# Patient Record
Sex: FEMALE | Employment: FULL TIME | URBAN - METROPOLITAN AREA
[De-identification: names, ages, dates, MRNs, and addresses within clinical notes are randomized per-mention and may not be internally consistent; named-entity substitution may affect disease eponyms.]

---

## 2018-04-17 ENCOUNTER — HOSPITAL ENCOUNTER (EMERGENCY)
Facility: HOSPITAL | Age: 53
Discharge: HOME | End: 2018-04-17
Attending: FAMILY MEDICINE
Payer: COMMERCIAL

## 2018-04-17 VITALS
WEIGHT: 171 LBS | SYSTOLIC BLOOD PRESSURE: 154 MMHG | HEIGHT: 63 IN | OXYGEN SATURATION: 99 % | TEMPERATURE: 98 F | HEART RATE: 68 BPM | BODY MASS INDEX: 30.3 KG/M2 | RESPIRATION RATE: 18 BRPM | DIASTOLIC BLOOD PRESSURE: 96 MMHG

## 2018-04-17 DIAGNOSIS — R03.0 ELEVATED BLOOD PRESSURE READING: ICD-10-CM

## 2018-04-17 DIAGNOSIS — G44.85 PRIMARY STABBING HEADACHE: Primary | ICD-10-CM

## 2018-04-17 PROCEDURE — 63600175 PHARM REV CODE 636 W HCPCS: Performed by: FAMILY MEDICINE

## 2018-04-17 PROCEDURE — 99283 EMERGENCY DEPT VISIT LOW MDM: CPT | Mod: 25

## 2018-04-17 PROCEDURE — 96372 THER/PROPH/DIAG INJ SC/IM: CPT

## 2018-04-17 PROCEDURE — 25000003 PHARM REV CODE 250: Performed by: FAMILY MEDICINE

## 2018-04-17 RX ORDER — PROMETHAZINE HYDROCHLORIDE 25 MG/ML
25 INJECTION, SOLUTION INTRAMUSCULAR; INTRAVENOUS
Status: COMPLETED | OUTPATIENT
Start: 2018-04-17 | End: 2018-04-17

## 2018-04-17 RX ORDER — KETOROLAC TROMETHAMINE 30 MG/ML
60 INJECTION, SOLUTION INTRAMUSCULAR; INTRAVENOUS
Status: COMPLETED | OUTPATIENT
Start: 2018-04-17 | End: 2018-04-17

## 2018-04-17 RX ORDER — CLONIDINE HYDROCHLORIDE 0.1 MG/1
0.1 TABLET ORAL
Status: COMPLETED | OUTPATIENT
Start: 2018-04-17 | End: 2018-04-17

## 2018-04-17 RX ADMIN — CLONIDINE HYDROCHLORIDE 0.1 MG: 0.1 TABLET ORAL at 10:04

## 2018-04-17 RX ADMIN — PROMETHAZINE HYDROCHLORIDE 25 MG: 25 INJECTION INTRAMUSCULAR; INTRAVENOUS at 08:04

## 2018-04-17 RX ADMIN — KETOROLAC TROMETHAMINE 60 MG: 30 INJECTION, SOLUTION INTRAMUSCULAR; INTRAVENOUS at 08:04

## 2018-04-18 NOTE — ED PROVIDER NOTES
Encounter Date: 2018       History     Chief Complaint   Patient presents with    Headache    Nausea    Dizziness     Complains of severe headache, started two hours ago. Nauseated, unable to allow me to perform complete exam, states she will vomit. History of headaches.           Review of patient's allergies indicates:  No Known Allergies  History reviewed. No pertinent past medical history.  Past Surgical History:   Procedure Laterality Date     SECTION       No family history on file.  Social History   Substance Use Topics    Smoking status: Current Some Day Smoker     Packs/day: 0.50    Smokeless tobacco: Never Used    Alcohol use Yes      Comment: social     Review of Systems   Constitutional: Negative.    HENT: Negative.    Eyes: Negative.  Negative for photophobia and visual disturbance.   Respiratory: Negative.    Cardiovascular: Negative.    Gastrointestinal: Negative.    Endocrine: Negative.    Genitourinary: Negative.    Musculoskeletal: Negative.    Allergic/Immunologic: Negative.    Neurological: Positive for light-headedness and headaches. Negative for dizziness.   Hematological: Negative.    Psychiatric/Behavioral: Negative.        Physical Exam     Initial Vitals [18]   BP Pulse Resp Temp SpO2   (!) 176/107 84 20 97.9 °F (36.6 °C) 99 %      MAP       130         Physical Exam    Nursing note and vitals reviewed.  Constitutional: She appears well-developed and well-nourished. She is not diaphoretic. No distress.   HENT:   Head: Normocephalic and atraumatic.   Eyes:   Eyes appear normal however pt is unable to let me perform pupillary exam.    Neck: Normal range of motion. Neck supple.   Cardiovascular: Normal rate, regular rhythm, normal heart sounds and intact distal pulses. Exam reveals no gallop and no friction rub.    No murmur heard.  Pulmonary/Chest: Breath sounds normal. No respiratory distress. She has no wheezes. She has no rales.   Abdominal: Soft. Bowel  sounds are normal. She exhibits no distension. There is no tenderness.   Musculoskeletal: Normal range of motion. She exhibits no edema.   Neurological: She is alert and oriented to person, place, and time. She has normal strength.   Skin: Skin is warm and dry. Capillary refill takes less than 2 seconds. No rash noted. No erythema.   Psychiatric: She has a normal mood and affect. Her behavior is normal. Judgment and thought content normal.         ED Course   Procedures  Labs Reviewed - No data to display                               Clinical Impression:   The primary encounter diagnosis was Primary stabbing headache. A diagnosis of Elevated blood pressure reading was also pertinent to this visit.                           Allyn Estrada MD  04/17/18 9112

## 2018-05-26 ENCOUNTER — HOSPITAL ENCOUNTER (EMERGENCY)
Facility: HOSPITAL | Age: 53
Discharge: HOME | End: 2018-05-26
Attending: INTERNAL MEDICINE
Payer: COMMERCIAL

## 2018-05-26 VITALS
TEMPERATURE: 98 F | OXYGEN SATURATION: 96 % | WEIGHT: 164 LBS | RESPIRATION RATE: 18 BRPM | BODY MASS INDEX: 29.06 KG/M2 | SYSTOLIC BLOOD PRESSURE: 154 MMHG | DIASTOLIC BLOOD PRESSURE: 86 MMHG | HEART RATE: 74 BPM | HEIGHT: 63 IN

## 2018-05-26 DIAGNOSIS — R03.0 ELEVATED BLOOD PRESSURE READING: Primary | ICD-10-CM

## 2018-05-26 PROCEDURE — 99283 EMERGENCY DEPT VISIT LOW MDM: CPT

## 2018-05-26 RX ORDER — KETOROLAC TROMETHAMINE 30 MG/ML
60 INJECTION, SOLUTION INTRAMUSCULAR; INTRAVENOUS
Status: DISCONTINUED | OUTPATIENT
Start: 2018-05-26 | End: 2018-05-26

## 2018-05-26 RX ORDER — MECLIZINE HCL 12.5 MG 12.5 MG/1
12.5 TABLET ORAL 3 TIMES DAILY PRN
Qty: 20 TABLET | Refills: 0 | Status: SHIPPED | OUTPATIENT
Start: 2018-05-26 | End: 2019-08-30

## 2018-05-26 NOTE — ED PROVIDER NOTES
CHIEF COMPLAINT  Chief Complaint   Patient presents with    Dizziness    Hypertension    feeling faint       HPI  Chio Castano a 52 y.o. female who presents to the ED complaining of dizziness.  was working as  when she started feeling really dizzy.  has been told that she had high blood pressure in the past but when she went to see her PCP it was good. Has never taken anything for her Blood Pressure.    CURRENT MEDICATIONS  No current facility-administered medications on file prior to encounter.      No current outpatient prescriptions on file prior to encounter.       ALLERGIES  Review of patient's allergies indicates:  No Known Allergies      There is no immunization history on file for this patient.    PAST MEDICAL HISTORY  Past Medical History:   Diagnosis Date    Migraine headache        SURGICAL HISTORY  Past Surgical History:   Procedure Laterality Date     SECTION         SOCIAL HISTORY  Social History     Social History    Marital status: Single     Spouse name: N/A    Number of children: N/A    Years of education: N/A     Occupational History    Not on file.     Social History Main Topics    Smoking status: Current Some Day Smoker     Packs/day: 0.50    Smokeless tobacco: Never Used    Alcohol use Yes      Comment: social    Drug use: No    Sexual activity: Yes     Other Topics Concern    Not on file     Social History Narrative    No narrative on file       FAMILY HISTORY  History reviewed. No pertinent family history.    REVIEW OF SYSTEMS  Constitutional: No fever, chills, or weakness.  Eyes: No redness, pain, or discharge  HENT: No ear pain, no headache, no rhinorrhea, no throat pain  Respiratory: No cough, wheezing or shortness of breath  Cardiovascular: No chest pain, palpitations or edema  GI: No abdominal pain, nausea, vomiting or diarrhea  Gu: No dysuria, no hematuria, or discharge  Musculoskeletal: No pain, full range of motion. Good sensation  Skin:  "No rash or abrasion  Neurologic: No focal weakness or sensory changes.  All systems otherwise negative except as noted in the Review of Systems and History of Present Illness      PHYSICAL EXAM  Reviewed Triage Note  VITAL SIGNS:   Patient Vitals for the past 24 hrs:   BP Temp Temp src Pulse Resp SpO2 Height Weight   05/26/18 1830 (!) 154/86 - - 74 18 - - -   05/26/18 1656 (!) 161/102 98.3 °F (36.8 °C) Oral 84 20 96 % 5' 3" (1.6 m) 74.4 kg (164 lb)     Constitutional: Well developed, well nourished, Alert and oriented x3, No acute distress, non-toxic appearance.  HENT: Normocephalic, Atraumatic, Bilateral external ears normal, external nose negative, oropharynx moist, No oral exudates.  Eyes: PERRL, EOMI, Conjunctiva normal, No discharge.  Neck: Normal range of motion, no tenderness, supple, no carotid bruits  Respiratory: Normal breath sounds, no respiratory distress, no wheezing, no rhonchi, no rales  Cardiovascular: Normal heart rate, normal rhythm, no murmurs, no rubs, no gallops.  Gi: Bowel sounds normal, soft, no tenderness, non-distended, no masses, no pulsatile masses.  Musculoskeletal: No edema, no tenderness, no cyanosis, no clubbing. Good range of motion in all major joints. No tenderness to palpation or major deformities noted.   Integument: Warm, Dry, No erythema, no rash  Neurologic: Normal motor function, normal sensory function. No focal deficits noted. Intact distal pulses  Psychiatric: Affect normal, judgment normal, mood normal      LABS  Pertinent labs reviewed. (see chart for details)  Labs Reviewed - No data to display    RADIOLOGY  No orders to display         PROCEDURE  Procedures      ED COURSE & MEDICAL DECISION MAKING  ED Course as of May 26 2116   Sat May 26, 2018   1825 BP now 152/86. States still feels a little dizzy  [NP]      ED Course User Index  [NP] MELANIE Vásquez       Physical exam findings discussed with patient. No acute emergent medical condition identified at " this time to warrant further testing. Will dispo home with instructions to follow up with PCP as needed, return to the ED for worsening condition. Pt agrees with plan of care.     DISPOSITION  Patient discharged in stable condition 5/26/2018  6:46 PM      CLINICAL IMPRESSION:  The encounter diagnosis was Elevated blood pressure reading.    Patient advised to follow-up with your PCP within 3 days for BP re-check if Blood Pressure was >120/80 without history of hypertension.         Monalisa Vick, MELANIE  05/26/18 6508

## 2018-08-01 DIAGNOSIS — M89.8X1 PAIN OF RIGHT CLAVICLE: Primary | ICD-10-CM

## 2018-08-03 ENCOUNTER — OFFICE VISIT (OUTPATIENT)
Dept: ORTHOPEDICS | Facility: CLINIC | Age: 53
End: 2018-08-03
Payer: OTHER MISCELLANEOUS

## 2018-08-03 ENCOUNTER — HOSPITAL ENCOUNTER (OUTPATIENT)
Dept: RADIOLOGY | Facility: HOSPITAL | Age: 53
Discharge: HOME | End: 2018-08-03
Attending: ORTHOPAEDIC SURGERY
Payer: OTHER MISCELLANEOUS

## 2018-08-03 VITALS
SYSTOLIC BLOOD PRESSURE: 146 MMHG | DIASTOLIC BLOOD PRESSURE: 84 MMHG | HEIGHT: 63 IN | WEIGHT: 164 LBS | HEART RATE: 75 BPM | BODY MASS INDEX: 29.06 KG/M2

## 2018-08-03 DIAGNOSIS — M89.8X1 PAIN OF RIGHT CLAVICLE: ICD-10-CM

## 2018-08-03 DIAGNOSIS — M13.811 OTHER SPECIFIED ARTHRITIS, RIGHT SHOULDER: ICD-10-CM

## 2018-08-03 DIAGNOSIS — S46.011A ROTATOR CUFF STRAIN, RIGHT, INITIAL ENCOUNTER: ICD-10-CM

## 2018-08-03 DIAGNOSIS — M25.511 RIGHT SHOULDER PAIN, UNSPECIFIED CHRONICITY: ICD-10-CM

## 2018-08-03 DIAGNOSIS — S43.101A: ICD-10-CM

## 2018-08-03 DIAGNOSIS — M25.511 RIGHT SHOULDER PAIN, UNSPECIFIED CHRONICITY: Primary | ICD-10-CM

## 2018-08-03 PROCEDURE — 99204 OFFICE O/P NEW MOD 45 MIN: CPT | Mod: S$GLB,,, | Performed by: ORTHOPAEDIC SURGERY

## 2018-08-03 PROCEDURE — 73030 X-RAY EXAM OF SHOULDER: CPT | Mod: 26,RT,, | Performed by: RADIOLOGY

## 2018-08-03 PROCEDURE — 73030 X-RAY EXAM OF SHOULDER: CPT | Mod: TC,FY,RT

## 2018-08-03 PROCEDURE — 99999 PR PBB SHADOW E&M-EST. PATIENT-LVL III: CPT | Mod: 25,PBBFAC,, | Performed by: ORTHOPAEDIC SURGERY

## 2018-08-03 PROCEDURE — 73000 X-RAY EXAM OF COLLAR BONE: CPT | Mod: TC,FY,RT

## 2018-08-03 PROCEDURE — 73000 X-RAY EXAM OF COLLAR BONE: CPT | Mod: 26,RT,, | Performed by: RADIOLOGY

## 2018-08-03 NOTE — PROGRESS NOTES
Subjective:      Patient ID: Chio Garvey is a 52 y.o. female.    Chief Complaint: Shoulder Pain (right clavicle pain)    Referring Provider: No referring provider defined for this encounter.     HPI:  Ms. Garvey is a 53-year-old right-hand-dominant female who presented today with approximately 2 weeks of right shoulder and sternal clavicular pain which began when she was hoisting linen bags into a linen been and felt a pop in her shoulder.  She stated she had heavy lifting bags and she was lifting arm up overhead putting them into a bin shoot.  Her date of injury was approximately 06/15/2018.  Internal and external rotation of her shoulder increases her symptoms. Heating pad improves them. Her symptoms occasionally awaken her at night.  She has taken NSAIDs with help but has not done physical therapy nor had injections.    Past Medical History:   Diagnosis Date     *  * Migraine headache  Seasonal allergies  Anxiety      Past Surgical History:   Procedure Laterality Date     *  SECTION  T&A       Review of patient's allergies indicates:  No Known Allergies    Social History     Occupational History    Housekeeping.     Social History Main Topics    Smoking status: Current Some Day Smoker     Packs/day: 0.50    Smokeless tobacco: Never Used    Alcohol use Yes      Comment: social    Drug use: No    Sexual activity: Yes      Family history:  Father:  , heart attack.  Mother:  Alive, hypertension.  Brother:  1, alive, denied medical problems  Sister:  6, 1 , complications of medications.  Son:  3, 1 , still birth.  Daughter:  2, alive, scoliosis.    Previous Hospitalizations:  Childbirth.    Review of Systems   Constitution: Negative for chills and fever.   HENT: Negative for hearing loss.    Eyes: Negative for double vision.   Cardiovascular: Negative for syncope.   Respiratory: Negative for cough.    Endocrine: Negative for polydipsia.   Skin: Negative for poor wound healing and  rash.   Musculoskeletal: Positive for joint pain.   Gastrointestinal: Negative for constipation and diarrhea.   Genitourinary: Negative for flank pain.   Neurological: Negative for numbness.   Psychiatric/Behavioral: The patient is nervous/anxious.    Allergic/Immunologic: Positive for environmental allergies.          Objective:      Physical Exam:  General:  Nervous, unique, AAOx3.  No acute distress  HEENT: Normocephalic, PEARLA EOMI, Good Dentition  Neck: Supple, No JVD  Chest: Symetric, equal excursion on inspiration  Abdomen: Soft NTND  Vascular:  Pulses intact and equal bilaterally.  Capillary refill less than 3 seconds and equal bilaterally  Neurologic:  Pinprick and soft touch over both deltoids intact and equal bilaterally.  Integment:  No ecchymosis, no errythema  Extremity:  Shoulder:  Forward flexion/abduction equal bilaterally 0/180 degrees. Internal rotation equal bilaterally T10.  Mildly positive lift-off right upper extremity.  External rotation equal bilaterally 0/22 degrees. Full can negative both shoulders.  Empty can mildly positive right shoulder.  Henson/Neer mildly positive right shoulder.  Cross-arm positive right shoulder with tenderness at the sternoclavicular joint. Nontender over the acromioclavicular joint both shoulders.  Mild prominence right stay acromioclavicular joint. Prominence right sternoclavicular joint. Tender with palpation right sternoclavicular joint. Nontender in the bicipital groove both shoulders.  Yergason's negative both shoulders.  Apprehension/relocation negative both shoulders.  Radiography:  Personally reviewed x-rays of the right shoulder and right clavicle which showed no fracture, acromioclavicular arthritis and type 2 acromion present. C-spine arthritis present.      Assessment:       Impression:     1. Closed anterior sternoclavicular dislocation of clavicle,right, initial encounter    2. Rotator cuff strain, right shoulder, initial encounter    3. AC  arthritis, right shoulder          Plan:       1.  Discussed physical examination and radiographic findings with the patient. Chio understands that she has 2 items affecting her, she has a strain of her rotator cuff and an anterior dislocation of the sternoclavicular joint. She understands with conservative management she could improved.  If she fails conservative management then surgical intervention can be entertained.     2.  Offered a steroid injection to the right shoulder she adamantly refused.  3.  Refer to physical therapy to start motion exercises with pain reduction, and upper extremity strengthening.  4.  Home exercises to include Codman exercises, wall walking exercises, cane exercises, towel exercises, and shoulder extension exercises were shown discussed with the patient.  5.  Norco 5/325, 1 or 2 p.o. Q 4-6 hours, dispense 20, refill 0.  6.  Voltaren 1% gel, apply to affected area twice daily, dispense 100 g, refill 5.  7.  Mobic 15 mg, 1 p.o. q.day, dispense number 30, refill 5.  8.  Because the patient was having some pain she was placed in a sling she understands she should stop wearing the sling as soon as possible and start the exercises as discussed above.  9.  Return to work with restrictions of no lifting/pushing/pulling greater than 5 lb with the right upper extremity.  10.  Expect the patient to be back to full unrestricted activities within the next 6-10 weeks.  11.  Follow up approximately 1 month after beginning physical therapy for re-evaluation.  If she has not improved at next visit we will rediscuss possible injection with her at that time.

## 2018-08-06 ENCOUNTER — CLINICAL SUPPORT (OUTPATIENT)
Dept: REHABILITATION | Facility: HOSPITAL | Age: 53
End: 2018-08-06
Payer: OTHER MISCELLANEOUS

## 2018-08-06 DIAGNOSIS — S46.019A ROTATOR CUFF STRAIN: ICD-10-CM

## 2018-08-06 DIAGNOSIS — S43.101A: ICD-10-CM

## 2018-08-06 DIAGNOSIS — S46.011A ROTATOR CUFF STRAIN, RIGHT, INITIAL ENCOUNTER: Primary | ICD-10-CM

## 2018-08-06 DIAGNOSIS — S42.009A: Primary | ICD-10-CM

## 2018-08-06 PROCEDURE — 97110 THERAPEUTIC EXERCISES: CPT

## 2018-08-06 PROCEDURE — 97166 OT EVAL MOD COMPLEX 45 MIN: CPT

## 2018-08-06 PROCEDURE — 97010 HOT OR COLD PACKS THERAPY: CPT

## 2018-08-06 NOTE — PROGRESS NOTES
"[facility]  Occupational Therapy Initial Evaluation    Name: Vasile Garvey  Clinic Number: [id number]    Therapy Diagnosis:   Encounter Diagnoses   Name Primary?    Rotator cuff strain, right, initial encounter Yes    Dislocation of clavicle, closed, right, initial encounter      Physician: No ref. 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 She reports trying to doctor it with a heating pad in the following days. Patient reports the pain and discomfort have become too much to tolerate at this point.       Past Medical History:   Diagnosis Date    Migraine headache      Vasile Garvey  has a past surgical history that includes  section.    Vasile has a current medication list which includes the following prescription(s): meclizine.    Review of patient's allergies indicates:  No Known Allergies     Imaging, X-Rays    Prior Therapy: none  Social History: lives with their spouse   Occupation: Housekeeping at [employer]  Prior Level of Function: Independent   Current Level of Function: Patient reports she is performing her ADL's in tasks of dressing, bathing, grooming, and toileting, but adds that is "just hurts." This limitation of R dominant UE " "affects her skills required in performing work tasks (IADL's) in housekeeping.     Pain:  Current 5/10, worst 10/10  Location: right shoulder   Description: Throbbing, shoulder and neck RUE  Aggravating Factors: Flexing and Lifting  Easing Factors: Patient reports not tolerating [place] because she reports it is too strong.     Pts goals:   I want to get the swelling down and decrease the pain R clavicle and to be able to use it without pain and be back to normal    Objective     Patient reports limitations in requires work skills required of housekeeping tasks in the [employer] of employment, [employer]. Patient reports increase in pain, discomfort, and limitations R shoulder with observable deformity at sternum and clavicular joint. Patient appears frustrated AEB shaking her head and making comments that she is very uncomfortable or that the pain radiates to R neck muscles. She repeatedly reports her shoulder causes "stretching" and "discomfort." OT discussed with patient that she would feel discomfort with this injury and that the goal is to improve her mobility and decrease her pain level in order for her to return to normal activities. OT discussed with patient positioning needs/use of a pillow for sleeping, in addition to management of pain through prescribed medications given. She reports the Norco seems like it is too strong. OT suggested cutting the pill in half.              TREATMENT   Treatment Time In: 330  Treatment Time Out: 400  Total Treatment time separate from Evaluation:30 minutes    VASILE received therapeutic exercises to develop ROM including:  Pendulums performed RUE forward, lateral, clockwise, and counter clockwise as tolerated x 25 reps each  1# dowel in B elbow flexion x 15  B shoulder shrugs x 10     VASILE received cold pack for 15 minutes to R clavicle and shoulder/neck area    Home Exercises and Patient Education Provided    Education provided:   -R shoulder sling wear with " adjustments pulling it up higher and closer to her body; use of pillow to sleep/position UE at night.    Written Home Exercises Provided: yes.  Exercises were reviewed and VASILE was able to demonstrate them prior to the end of the session.  VASILE demonstrated good  understanding of the education provided.     See EMR under Patient Instructions for exercises provided 8/6/2018.    Assessment   Vasile is a 52 y.o. female referred to outpatient Occupational Therapy with a medical diagnosis of R clavicle dislocation and RTC strain. . Pt presents with increase in pain R clavicle/shoulder/neck and impaired mobility.    Pt prognosis is Good.   Pt will benefit from skilled outpatient Physical Therapy to address the deficits stated above and in the chart below, provide pt/family education, and to maximize pt's level of independence.     Plan of care discussed with patient: Yes  Pt's spiritual, cultural and educational needs considered and patient is agreeable to the plan of care and goals as stated below:     Anticipated Barriers for therapy: none      Goals:  Short Term Goals: 3 weeks   Patient will demonstrate independence in performing pendulums as HEP  Patient will verbalize understanding in positioning and sling wear consistently RUE  Patient will report <4/10 pain with ROM R shoulder    Long Term Goals:6 weeks   Patient will tolerate AAROM R shoulder FF/ABD/EXT exercises >120 degrees  Patient will report <2/10 pain R shoulder FF/ABD up to 100 degrees AROM   Patient will tolerate MIN resist R shoulder activities.     Plan   Plan of care Certification: 8/6/2018 to 09/14/2018.    Outpatient Occupational Therapy 2-3 times weekly for 6 weeks to include the following interventions: Manual Therapy, Moist Heat/ Ice, Patient Education, Self Care, Therapeutic Activites and Therapeutic Exercise.     Opal Escobedo, OT   Signature of Therapist    I certify the need for these services furnished under this plan of treatment and while  under my care.______________________________                           Physician/Referring Practitioner  Date of Signature:

## 2018-08-06 NOTE — PATIENT INSTRUCTIONS
Pendulum (Flexibility)    1. Lean over next to a table, with your left arm supporting your weight on the table.  2. Relax your right arm and let it hang straight down.  3. Slowly begin to swing your right arm in a small [place]. Gradually make the [place] bigger if you can. Change direction after 1 minute of motion.  4. Next, swing your right arm backward and forward. Then move it side to side. Change direction after 1 minute of motion.  5. Repeat these movements for about 5 minutes.  6. Do this exercise 3 times a day, or as often as instructed.  Date Last Reviewed: 3/10/2016  © 1869-2708 [employer]. [address] [place] [address]. All rights reserved. This information is not intended as a substitute for professional medical care. Always follow your healthcare professional's instructions.        Shoulder Shrug Exercise    To start, sit in a chair with your feet flat on the floor. Shift your weight slightly forward to avoid rounding your back. Relax. Keep your ears, shoulders, and hips aligned:  · Raise both of your shoulders as high as you can, as if you were trying to touch them to your ears. Keep your head and neck still and relaxed.  · Hold for a count of 10. Release.  · Repeat 5 times.  For your safety, check with your healthcare provider before starting an exercise program.   Date Last Reviewed: 8/16/2015  © 7438-2135 [employer]. [address] [place] [address]. All rights reserved. This information is not intended as a substitute for professional medical care. Always follow your healthcare professional's instructions.

## 2018-08-06 NOTE — PLAN OF CARE
"[facility]  Occupational Therapy Initial Evaluation    Name: Vasile Garvey  Clinic Number: [id number]    Therapy Diagnosis:   Encounter Diagnoses   Name Primary?    Rotator cuff strain, right, initial encounter Yes    Dislocation of clavicle, closed, right, initial encounter      Physician: No ref. 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 She reports trying to doctor it with a heating pad in the following days. Patient reports the pain and discomfort have become too much to tolerate at this point.       Past Medical History:   Diagnosis Date    Migraine headache      Vasile Garvey  has a past surgical history that includes  section.    Vasile has a current medication list which includes the following prescription(s): meclizine.    Review of patient's allergies indicates:  No Known Allergies     Imaging, X-Rays    Prior Therapy: none  Social History: lives with their spouse   Occupation: Housekeeping at [employer]  Prior Level of Function: Independent   Current Level of Function: Patient reports she is performing her ADL's in tasks of dressing, bathing, grooming, and toileting, but adds that is "just hurts." This limitation of R dominant UE " "affects her skills required in performing work tasks (IADL's) in housekeeping.     Pain:  Current 5/10, worst 10/10  Location: right shoulder   Description: Throbbing, shoulder and neck RUE  Aggravating Factors: Flexing and Lifting  Easing Factors: Patient reports not tolerating [place] because she reports it is too strong.     Pts goals:   I want to get the swelling down and decrease the pain R clavicle and to be able to use it without pain and be back to normal    Objective     Patient reports limitations in requires work skills required of housekeeping tasks in the [employer] of employment, [employer]. Patient reports increase in pain, discomfort, and limitations R shoulder with observable deformity at sternum and clavicular joint. Patient appears frustrated AEB shaking her head and making comments that she is very uncomfortable or that the pain radiates to R neck muscles. She repeatedly reports her shoulder causes "stretching" and "discomfort." OT discussed with patient that she would feel discomfort with this injury and that the goal is to improve her mobility and decrease her pain level in order for her to return to normal activities. OT discussed with patient positioning needs/use of a pillow for sleeping, in addition to management of pain through prescribed medications given. She reports the Norco seems like it is too strong. OT suggested cutting the pill in half.              TREATMENT   Treatment Time In: 330  Treatment Time Out: 400  Total Treatment time separate from Evaluation:30 minutes    VASILE received therapeutic exercises to develop ROM including:  Pendulums performed RUE forward, lateral, clockwise, and counter clockwise as tolerated x 25 reps each  1# dowel in B elbow flexion x 15  B shoulder shrugs x 10     VASILE received cold pack for 15 minutes to R clavicle and shoulder/neck area    Home Exercises and Patient Education Provided    Education provided:   -R shoulder sling wear with " adjustments pulling it up higher and closer to her body; use of pillow to sleep/position UE at night.    Written Home Exercises Provided: yes.  Exercises were reviewed and VASILE was able to demonstrate them prior to the end of the session.  VASILE demonstrated good  understanding of the education provided.     See EMR under Patient Instructions for exercises provided 8/6/2018.    Assessment   Vasile is a 52 y.o. female referred to outpatient Occupational Therapy with a medical diagnosis of R clavicle dislocation and RTC strain. . Pt presents with increase in pain R clavicle/shoulder/neck and impaired mobility.    Pt prognosis is Good.   Pt will benefit from skilled outpatient Physical Therapy to address the deficits stated above and in the chart below, provide pt/family education, and to maximize pt's level of independence.     Plan of care discussed with patient: Yes  Pt's spiritual, cultural and educational needs considered and patient is agreeable to the plan of care and goals as stated below:     Anticipated Barriers for therapy: none      Goals:  Short Term Goals: 3 weeks   Patient will demonstrate independence in performing pendulums as HEP  Patient will verbalize understanding in positioning and sling wear consistently RUE  Patient will report <4/10 pain with ROM R shoulder    Long Term Goals:6 weeks   Patient will tolerate AAROM R shoulder FF/ABD/EXT exercises >120 degrees  Patient will report <2/10 pain R shoulder FF/ABD up to 100 degrees AROM   Patient will tolerate MIN resist R shoulder activities.     Plan   Plan of care Certification: 8/6/2018 to 09/14/2018.    Outpatient Occupational Therapy 2-3 times weekly for 6 weeks to include the following interventions: Manual Therapy, Moist Heat/ Ice, Patient Education, Self Care, Therapeutic Activites and Therapeutic Exercise.     Opal Escobedo, OT   Signature of Therapist    I certify the need for these services furnished under this plan of treatment and while  under my care.______________________________                           Physician/Referring Practitioner  Date of Signature:

## 2018-08-07 ENCOUNTER — TELEPHONE (OUTPATIENT)
Dept: ORTHOPEDICS | Facility: CLINIC | Age: 53
End: 2018-08-07

## 2018-08-07 NOTE — TELEPHONE ENCOUNTER
Called patient to inform her that Dr. cardona will no longer be at [facility] on Fri and Wed. No answer at patient's phone and left voicemail stating her appt has been rescheduled for Monday, Sept 17 at 9:00AM at the [place] location.

## 2018-08-08 ENCOUNTER — CLINICAL SUPPORT (OUTPATIENT)
Dept: REHABILITATION | Facility: HOSPITAL | Age: 53
End: 2018-08-08
Payer: OTHER MISCELLANEOUS

## 2018-08-08 DIAGNOSIS — M13.811 OTHER SPECIFIED ARTHRITIS, RIGHT SHOULDER: Primary | ICD-10-CM

## 2018-08-08 PROCEDURE — 97010 HOT OR COLD PACKS THERAPY: CPT

## 2018-08-08 PROCEDURE — 97110 THERAPEUTIC EXERCISES: CPT

## 2018-08-08 NOTE — PROGRESS NOTES
Occupational Therapy Daily Treatment Note     Name: Vasile QUACH [facility] Number: [id number]    Therapy Diagnosis:   Encounter Diagnosis   Name Primary?    Other specified arthritis, right shoulder Yes     Physician: William Dyer DO    Visit Date: 8/8/2018  Physician Orders: OT Eval & Treat  Medical Diagnosis: R sternoclavicular dislocation   Evaluation Date: 08/06/2018  Authorization Period Expiration: 6 weeks  Plan of Care Certification Period: 09/14/2018  Visit # 2    Time In: 205  Time Out: 310  Total Billable Time: 45 minutes    Precautions: Standard    Subjective     Pt reports: she left her sling in the car and was in a hurry. OT encouraged patient to consistently wear the issued sling RUE.  She was compliant with use of pillow under RUE sleeping and that it was more comfortable.  Response to previous treatment:tolerated treatment successfully  Functional change: no change, continues to report discomfort    Pain: 5/10  Location: right shoulder      Objective   VASILE received hot pack for 15 minutes to R clavicle/shoulder/neck area     VASILE received therapeutic exercises to develop endurance, ROM and flexibility for including:  Pendulums RUE with guided instruction in forward, lateral, clockwise, and counter clockwise 2 x 30 each   B shoulder shrugs and rolls x 10 each  AROM B FF <90 degrees and ABD <90 degrees     VASILE received cold pack for 15 minutes to R clavicle/shoulder      Home Exercises Provided and Patient Education Provided     Education provided:   - Sling wear    Written Home Exercises Provided: Patient instructed to cont prior HEP.  Exercises were reviewed and VASILE was able to demonstrate them prior to the end of the session.  VASILE demonstrated good  understanding of the education provided.     See EMR under Patient Instructions for exercises provided prior visit.    Assessment     Patient tolerating treatments with report of discomfort. She reports heat as helping the most.     Pt prognosis is  Good.     Pt will continue to benefit from skilled outpatient occupational therapy to address the deficits listed in the problem list box on initial evaluation, provide pt/family education and to maximize pt's level of independence in the home and community environment.     Pt's spiritual, cultural and educational needs considered and pt agreeable to plan of care and goals.    Anticipated barriers to occupational therapy:     Goals:  Short Term Goals: 3 weeks   Patient will demonstrate independence in performing pendulums as HEP  Patient will verbalize understanding in positioning and sling wear consistently RUE  Patient will report <4/10 pain with ROM R shoulder     Long Term Goals:6 weeks   Patient will tolerate AAROM R shoulder FF/ABD/EXT exercises >120 degrees  Patient will report <2/10 pain R shoulder FF/ABD up to 100 degrees AROM   Patient will tolerate MIN resist R shoulder activities.     Plan     Continue OT per established POC.    Opal Escobedo, OT

## 2018-08-09 ENCOUNTER — CLINICAL SUPPORT (OUTPATIENT)
Dept: REHABILITATION | Facility: HOSPITAL | Age: 53
End: 2018-08-09
Payer: OTHER MISCELLANEOUS

## 2018-08-09 DIAGNOSIS — S43.101A: Primary | ICD-10-CM

## 2018-08-09 DIAGNOSIS — S46.011A ROTATOR CUFF STRAIN, RIGHT, INITIAL ENCOUNTER: ICD-10-CM

## 2018-08-09 PROCEDURE — 97110 THERAPEUTIC EXERCISES: CPT

## 2018-08-09 PROCEDURE — 97010 HOT OR COLD PACKS THERAPY: CPT

## 2018-08-09 NOTE — PROGRESS NOTES
Occupational Therapy Daily Treatment Note     Name: Vasile QUACH [facility] Number: [id number]    Therapy Diagnosis:   Encounter Diagnoses   Name Primary?    Dislocation of clavicle, closed, right, initial encounter Yes    Rotator cuff strain, right, initial encounter      Physician: William Dyer DO    Visit Date: 8/9/2018  Physician Orders: OT Eval & Treat  Medical Diagnosis: R sternoclavicular dislocation   Evaluation Date: 08/06/2018  Authorization Period Expiration: 6 weeks  Plan of Care Certification Period: 09/14/2018  Visit # 2    Time In: 300  Time Out: 400  Total Billable Time: 45 minutes    Precautions: Standard    Subjective     Pt reports: feeling like she did too much at work last night even on light duty. She reports feeling frustrated that she cannot perform task as well but also feels like she needs to perform household duties while on shift, even if on light duty. 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 Patient is upset and tearful related to her current situation. Encouraged patient to seek Amelia to discuss issues she is having.   Response to previous treatment:tolerated treatment successfully  Functional change: no change, continues to report discomfort    Pain: 5/10  Location: right shoulder      Objective   VASILE received hot pack for 15 minutes to R clavicle/shoulder/neck area     VASILE received therapeutic exercises to develop endurance, ROM and flexibility for including:  AROM R elbow flex/ext x 15  Extra light digi flex R hand x 25  Pendulums RUE with guided instruction in forward, lateral, clockwise, and counter clockwise 2 x 30 each     VASILE received cold pack for 15 minutes to R clavicle/shoulder      Home Exercises Provided and Patient Education Provided     Education provided:   - Sling wear  -need to seek [employer]elia to discuss issues she is having in continuing work on light duty.     Written Home Exercises Provided: Patient instructed to cont prior HEP.  Exercises were reviewed  and VASILE was able to demonstrate them prior to the end of the session.  VASILE demonstrated good  understanding of the education provided.     See EMR under Patient Instructions for exercises provided prior visit.    Assessment     Patient is having difficulty tolerating treatments. Throughout treatment, patient is observed to become tearful and frustrated. She reports it is very uncomfortable.     Pt prognosis is Good.     Pt will continue to benefit from skilled outpatient occupational therapy to address the deficits listed in the problem list box on initial evaluation, provide pt/family education and to maximize pt's level of independence in the home and community environment. Patient may benefit from seeing MD sooner to discuss current issues due to patient difficulties in tolerating treatment and pain medications. She reports the medications are too much for her to handle/tolerate due to leaving her feeling groggy.     Pt's spiritual, cultural and educational needs considered and pt agreeable to plan of care and goals.    Anticipated barriers to occupational therapy: pain tolerance    Goals:  Short Term Goals: 3 weeks   Patient will demonstrate independence in performing pendulums as HEP  Patient will verbalize understanding in positioning and sling wear consistently RUE  Patient will report <4/10 pain with ROM R shoulder     Long Term Goals:6 weeks   Patient will tolerate AAROM R shoulder FF/ABD/EXT exercises >120 degrees  Patient will report <2/10 pain R shoulder FF/ABD up to 100 degrees AROM   Patient will tolerate MIN resist R shoulder activities.     Plan     Continue OT per established POC.    Opal Escobedo OT

## 2018-08-10 ENCOUNTER — OFFICE VISIT (OUTPATIENT)
Dept: ORTHOPEDICS | Facility: CLINIC | Age: 53
End: 2018-08-10
Payer: OTHER MISCELLANEOUS

## 2018-08-10 VITALS
BODY MASS INDEX: 29.06 KG/M2 | HEIGHT: 63 IN | HEART RATE: 71 BPM | SYSTOLIC BLOOD PRESSURE: 135 MMHG | WEIGHT: 164 LBS | DIASTOLIC BLOOD PRESSURE: 73 MMHG

## 2018-08-10 DIAGNOSIS — S43.101D: Primary | ICD-10-CM

## 2018-08-10 PROCEDURE — 99213 OFFICE O/P EST LOW 20 MIN: CPT | Mod: S$GLB,,, | Performed by: ORTHOPAEDIC SURGERY

## 2018-08-10 PROCEDURE — 99999 PR PBB SHADOW E&M-EST. PATIENT-LVL III: CPT | Mod: PBBFAC,,, | Performed by: ORTHOPAEDIC SURGERY

## 2018-08-10 RX ORDER — HYDROCODONE BITARTRATE AND ACETAMINOPHEN 5; 325 MG/1; MG/1
1 TABLET ORAL EVERY 6 HOURS PRN
COMMUNITY
End: 2018-08-27 | Stop reason: ALTCHOICE

## 2018-08-10 RX ORDER — MELOXICAM 15 MG/1
15 TABLET ORAL DAILY
COMMUNITY
End: 2019-08-30

## 2018-08-13 ENCOUNTER — CLINICAL SUPPORT (OUTPATIENT)
Dept: REHABILITATION | Facility: HOSPITAL | Age: 53
End: 2018-08-13
Payer: OTHER MISCELLANEOUS

## 2018-08-13 DIAGNOSIS — M13.811 OTHER SPECIFIED ARTHRITIS, RIGHT SHOULDER: Primary | ICD-10-CM

## 2018-08-13 PROCEDURE — 97010 HOT OR COLD PACKS THERAPY: CPT

## 2018-08-13 PROCEDURE — 97110 THERAPEUTIC EXERCISES: CPT

## 2018-08-13 PROCEDURE — 97140 MANUAL THERAPY 1/> REGIONS: CPT

## 2018-08-13 NOTE — PROGRESS NOTES
Occupational Therapy Daily Treatment Note     Name: Vasile QUACH [facility] Number: [id number]    Therapy Diagnosis:   Encounter Diagnosis   Name Primary?    Other specified arthritis, right shoulder Yes     Physician: William Dyer DO    Visit Date: 8/13/2018  Physician Orders: OT Eval & Treat  Medical Diagnosis: R sternoclavicular dislocation   Evaluation Date: 08/06/2018  Authorization Period Expiration: 6 weeks  Plan of Care Certification Period: 09/14/2018  Visit # 2    Time In: 300  Time Out: 400  Total Billable Time: 45 minutes    Precautions: Standard    Subjective     Pt reports: seeing MD on Friday and receiving new prescription she had not yet picked up. Patient continues to struggle with discomfort and pain affecting tolerance in treatment.   Response to previous treatment: pain effects tolerance to treatment  Functional change: no change, continues to report discomfort    Pain: 6/10  Location: right shoulder      Objective   VASILE received hot pack for 20 minutes to R clavicle/shoulder/neck area     VASILE received therapeutic exercises to develop endurance, ROM and flexibility for including:  AROM R elbow flex/ext x 15  AROM R hand flex x 25  Pendulums RUE in forward, lateral, clockwise, and counter clockwise  x 30 each     Vasile placed supine on table with towel placed between shoulder blades and received PROM light manipulation/stretching R shoulder in ABD at 90 and mild ext with support to R clavicle. Patient reports the towel between shoulder blades provides comfort in this position.       Home Exercises Provided and Patient Education Provided     Education provided:   - Sling wear  -need to seek Amelia to discuss issues she is having in continuing work on light duty.     Written Home Exercises Provided: Patient instructed to cont prior HEP.  Exercises were reviewed and VASILE was able to demonstrate them prior to the end of the session.  VASILE demonstrated good  understanding of the education provided.      See EMR under Patient Instructions for exercises provided prior visit.    Assessment     Patient is struggling to balance pain management, therapy visits, and work schedule. 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 Patient becomes tearful and is observed to be in pain per grimacing and signs of frustration. Patient received a work excuse after therapy due to increased level of pain and need for pain medications she is going to  that the MD wrote her due to her reports of having a difficult time with the current pain meds due to grogginess. Patient seems to have difficulty with effectively communicating her role and abilities to her supervisors. It's evident she needs to work and is trying to manage her job, but the job is unfortunately adding to her limitations and not allowing her the rest she needs to the R shoulder. Patient came back following speaking with Jorge, Director of Housekeeping, and he suggested she come to therapy earlier in the day with a rest break followed by continuing shift at 3pm. OT advised patient she will need to take an over the counter, such as Tylenol or Advil prior to morning treatment and during the work hours. Patient is in need of guidance for management of treatment and job duties.     Pt prognosis is Good.     Pt will continue to benefit from skilled outpatient occupational therapy to address the deficits listed in the problem list box on initial evaluation, provide pt/family education and to maximize pt's level of independence in the home and community environment.     Pt's spiritual, cultural and educational needs considered and pt agreeable to plan of care and goals.    Anticipated barriers to occupational therapy: pain tolerance    Goals:  Short Term Goals: 3 weeks   Patient will demonstrate independence in performing pendulums as  HEP  Patient will verbalize understanding in positioning and sling wear consistently RUE  Patient will report <4/10 pain with ROM R shoulder     Long Term Goals:6 weeks   Patient will tolerate AAROM R shoulder FF/ABD/EXT exercises >120 degrees  Patient will report <2/10 pain R shoulder FF/ABD up to 100 degrees AROM   Patient will tolerate MIN resist R shoulder activities.     Plan     Continue OT per established POC.    Opal Escobedo, OT

## 2018-08-13 NOTE — PROGRESS NOTES
Subjective:      Patient ID: Chio Garvey is a 52 y.o. female.    Chief Complaint: Pain of the Right Arm    HPI:  Ms. Garvey returns today with complaints of per persistent pain in her right shoulder.  The patient stated that she was going to physical therapy and then after physical therapy she goes to work.  When she takes her pain medications she has issues with drowsiness and pain stating she cannot work.  She has been given light duty with one-handed activities with her non affected arm.  She has not taken NSAIDs.  She has not been completely compliant with home exercises.  She was seen a little bit over a week ago.    ROS:  No new diagnosis/surgery/prescriptions since last visit on 08/03/2018.      Objective:    Physical Exam:  General: AAOx3.  No acute distress  Vascular:  Pulses intact and equal bilaterally.  Capillary refill less than 3 seconds and equal bilaterally  Neurologic:  Pinprick and soft touch intact and equal bilaterally  Integment:  No ecchymosis, no errythema  Extremity:Shoulder:  Forward flexion/abduction equal bilaterally 0/180 degrees. Internal rotation equal bilaterally T10.  Mildly positive lift-off right upper extremity.  External rotation equal bilaterally 0/22 degrees. Full can negative both shoulders.  Empty can mildly positive right shoulder.  Henson/Neer mildly positive right shoulder.  Cross-arm positive right shoulder with tenderness at the sternoclavicular joint. Nontender over the acromioclavicular joint both shoulders.  Mild prominence right sternal clavicular joint. Prominence right sternoclavicular joint. Tender with palpation right sternoclavicular joint. Nontender in the bicipital groove both shoulders.  Yergason's negative both shoulders.  Apprehension/relocation negative both shoulders.  Sternoclavicular joint on right side reducible but dislocates after reduction.  Radiography:  No new x-rays done today.        Assessment:       Impression:   sternoclavicular dislocation  right dragan chest.     Plan:       1.  Discussed physical examination and radiographic findings with the patient. Chio understands that she has a sternoclavicular dislocation and she will always have some mild prominence at her right dragan chest.  She also understands that she must return to work with work restrictions which have been on heard by her employer.  She will need to make accommodations for herself by not taking narcotic pain medications if they are making her too drowsy this was discussed in detail with the patient. She understands she can use other pain treatment modalities such as Tylenol and NSAIDs.  2.  Naprosyn 500 mg 1 p.o. b.i.d. dispense 60, refill 2.  3.  Ultram 50 mg, 1 p.o. t.i.d. p.r.n. pain, dispense 21, refill 0.  4.  Continue with physical/occupational therapy.  5.  Home exercises were reinforced with the patient.  6.  One handed activities with the left hand only may use right hand for activity such as riding.  7.  Follow up in 1 month with an x-ray of the right clavicle.

## 2018-08-16 ENCOUNTER — CLINICAL SUPPORT (OUTPATIENT)
Dept: REHABILITATION | Facility: HOSPITAL | Age: 53
End: 2018-08-16
Payer: OTHER MISCELLANEOUS

## 2018-08-16 DIAGNOSIS — S43.101A: Primary | ICD-10-CM

## 2018-08-16 PROCEDURE — 97010 HOT OR COLD PACKS THERAPY: CPT

## 2018-08-16 PROCEDURE — 97110 THERAPEUTIC EXERCISES: CPT

## 2018-08-16 NOTE — PROGRESS NOTES
Occupational Therapy Daily Treatment Note     Name: Vasile QUACH [facility] Number: [id number]    Therapy Diagnosis:   Encounter Diagnosis   Name Primary?    Dislocation of clavicle, closed, right, initial encounter Yes     Physician: William Dyer DO    Visit Date: 8/16/2018  Physician Orders: OT Eval & Treat  Medical Diagnosis: R sternoclavicular dislocation   Evaluation Date: 08/06/2018  Authorization Period Expiration: 6 weeks  Plan of Care Certification Period: 09/14/2018  Visit # 5    Time In: 300  Time Out: 400  Total Billable Time: 45 minutes    Precautions: Standard    Subjective     Pt reports: feeling like the inflammation has gone down R clavicle  Response to previous treatment: pain effects tolerance to treatment  Functional change: no change, continues to report discomfort    Pain: 5/10  Location: right shoulder      Objective   Vasile placed supine on table with towel placed between shoulder blades and received heat to R shoulder/clavicle and to neck x 20 mins. Patient reports the towel between shoulder blades provides comfort in this position.     VASILE received therapeutic exercises to develop endurance, ROM and flexibility for including:  AROM R elbow flex/ext x 20  Pendulums RUE in forward, lateral, clockwise, and counter clockwise  x 30 each     Vasile placed supine on table with towel placed between shoulder blades and received cold pack to R clavicle/shoulder/neck area.      Home Exercises Provided and Patient Education Provided     Education provided:   - emphasis in rest as needed and light duty work activities       Written Home Exercises Provided: Patient instructed to cont prior HEP.  Exercises were reviewed and VASILE was able to demonstrate them prior to the end of the session.  VASILE demonstrated good  understanding of the education provided.     See EMR under Patient Instructions for exercises provided prior visit.    Assessment       Pt prognosis is Good.     Pt will continue to benefit from  skilled outpatient occupational therapy to address the deficits listed in the problem list box on initial evaluation, provide pt/family education and to maximize pt's level of independence in the home and community environment.     Pt's spiritual, cultural and educational needs considered and pt agreeable to plan of care and goals.    Anticipated barriers to occupational therapy: pain tolerance    Goals:  Short Term Goals: 3 weeks   Patient will demonstrate independence in performing pendulums as HEP  Patient will verbalize understanding in positioning and sling wear consistently RUE  Patient will report <4/10 pain with ROM R shoulder     Long Term Goals:6 weeks   Patient will tolerate AAROM R shoulder FF/ABD/EXT exercises >120 degrees  Patient will report <2/10 pain R shoulder FF/ABD up to 100 degrees AROM   Patient will tolerate MIN resist R shoulder activities.     Plan     Continue OT per established POC.    Opal Escobedo OT

## 2018-08-17 ENCOUNTER — CLINICAL SUPPORT (OUTPATIENT)
Dept: REHABILITATION | Facility: HOSPITAL | Age: 53
End: 2018-08-17
Payer: OTHER MISCELLANEOUS

## 2018-08-17 DIAGNOSIS — S43.101A: Primary | ICD-10-CM

## 2018-08-17 DIAGNOSIS — S46.011A ROTATOR CUFF STRAIN, RIGHT, INITIAL ENCOUNTER: ICD-10-CM

## 2018-08-17 PROCEDURE — 97010 HOT OR COLD PACKS THERAPY: CPT

## 2018-08-17 PROCEDURE — 97110 THERAPEUTIC EXERCISES: CPT

## 2018-08-17 NOTE — PROGRESS NOTES
Occupational Therapy Daily Treatment Note     Name: Vasile QUACH [facility] Number: [id number]    Therapy Diagnosis:   Encounter Diagnoses   Name Primary?    Dislocation of clavicle, closed, right, initial encounter Yes    Rotator cuff strain, right, initial encounter      Physician: William Dyer DO    Visit Date: 8/17/2018  Physician Orders: OT Eval & Treat  Medical Diagnosis: R sternoclavicular dislocation   Evaluation Date: 08/06/2018  Authorization Period Expiration: 6 weeks  Plan of Care Certification Period: 09/14/2018  Visit # 6    Time In: 1100  Time Out: 1200  Total Billable Time: 45 minutes    Precautions: Standard    Subjective     Pt reports: some improvement due to not working last night  Response to previous treatment: pain effects tolerance to treatment      Pain: 5/10  Location: right shoulder      Objective   Vasile placed supine on table with towel placed between shoulder blades and received heat to R shoulder/clavicle and to neck x 20 mins. Patient reports the towel between shoulder blades provides comfort in this position.     VASILE received therapeutic exercises to develop endurance, ROM and flexibility for including:  AROM R elbow flex/ext x 20  Pendulums RUE in forward, lateral, clockwise, and counter clockwise  2 x 30 each     Vasile placed supine on table with towel placed between shoulder blades and received cold pack to R clavicle/shoulder/neck area.      Home Exercises Provided and Patient Education Provided     Education provided:   - emphasis in rest as needed and light duty work activities       Written Home Exercises Provided: Patient instructed to cont prior HEP.  Exercises were reviewed and VASILE was able to demonstrate them prior to the end of the session.  VASILE demonstrated good  understanding of the education provided.     See EMR under Patient Instructions for exercises provided prior visit.    Assessment       Pt prognosis is Good.     Pt will continue to benefit from skilled  outpatient occupational therapy to address the deficits listed in the problem list box on initial evaluation, provide pt/family education and to maximize pt's level of independence in the home and community environment.     Pt's spiritual, cultural and educational needs considered and pt agreeable to plan of care and goals.    Anticipated barriers to occupational therapy: pain tolerance    Goals:  Short Term Goals: 3 weeks   Patient will demonstrate independence in performing pendulums as HEP  Patient will verbalize understanding in positioning and sling wear consistently RUE  Patient will report <4/10 pain with ROM R shoulder     Long Term Goals:6 weeks   Patient will tolerate AAROM R shoulder FF/ABD/EXT exercises >120 degrees  Patient will report <2/10 pain R shoulder FF/ABD up to 100 degrees AROM   Patient will tolerate MIN resist R shoulder activities.     Plan     Continue OT per established POC.    Opal Escobedo OT

## 2018-08-20 ENCOUNTER — CLINICAL SUPPORT (OUTPATIENT)
Dept: REHABILITATION | Facility: HOSPITAL | Age: 53
End: 2018-08-20
Payer: OTHER MISCELLANEOUS

## 2018-08-20 DIAGNOSIS — S46.011A ROTATOR CUFF STRAIN, RIGHT, INITIAL ENCOUNTER: ICD-10-CM

## 2018-08-20 DIAGNOSIS — S43.101A: Primary | ICD-10-CM

## 2018-08-20 PROCEDURE — 97110 THERAPEUTIC EXERCISES: CPT

## 2018-08-20 PROCEDURE — 97010 HOT OR COLD PACKS THERAPY: CPT

## 2018-08-20 NOTE — PROGRESS NOTES
Occupational Therapy Daily Treatment Note     Name: Vasile QUACH [facility] Number: [id number]    Therapy Diagnosis:   Encounter Diagnoses   Name Primary?    Dislocation of clavicle, closed, right, initial encounter Yes    Rotator cuff strain, right, initial encounter      Physician: William Dyer DO    Visit Date: 8/20/2018  Physician Orders: OT Eval & Treat  Medical Diagnosis: R sternoclavicular dislocation   Evaluation Date: 08/06/2018  Authorization Period Expiration: 6 weeks  Plan of Care Certification Period: 09/14/2018  Visit # 7    Time In: 200  Time Out: 300  Total Billable Time: 45 minutes    Precautions: Standard    Subjective     Pt reports: not doing anything with RUE over the weekend and allowing it to rest, which improved in comfort.  Response to previous treatment: tolerated last treatment with discomfort reported in treatment.       Pain: 2/10  Location: right shoulder/clavicle/neck      Objective   Vasile placed supine on table with towel placed between shoulder blades and received heat to R shoulder/clavicle and to neck x 20 mins. Patient reports the towel between shoulder blades provides comfort in this position.     VASILE received therapeutic exercises to develop endurance, ROM and flexibility for including:  AROM R elbow flex/ext x 20  Pendulums RUE in forward, lateral, clockwise, and counter clockwise  2 x 30 each     Vasile placed supine on table with towel placed between shoulder blades and received cold pack to R clavicle/shoulder/neck area.      Home Exercises Provided and Patient Education Provided     Education provided:   - emphasis in rest as needed and light duty work activities       Written Home Exercises Provided: Patient instructed to cont prior HEP.  Exercises were reviewed and VASILE was able to demonstrate them prior to the end of the session.  VASILE demonstrated good  understanding of the education provided.     See EMR under Patient Instructions for exercises provided prior  visit.  Patient issued work excuse due to report she feels unable to participate in both therapy and work due to restraints of RUE.    Assessment       Pt prognosis is Good.     Pt will continue to benefit from skilled outpatient occupational therapy to address the deficits listed in the problem list box on initial evaluation, provide pt/family education and to maximize pt's level of independence in the home and community environment.     Pt's spiritual, cultural and educational needs considered and pt agreeable to plan of care and goals.    Anticipated barriers to occupational therapy: pain tolerance    Goals:  Short Term Goals: 3 weeks   Patient will demonstrate independence in performing pendulums as HEP  Patient will verbalize understanding in positioning and sling wear consistently RUE  Patient will report <4/10 pain with ROM R shoulder     Long Term Goals:6 weeks   Patient will tolerate AAROM R shoulder FF/ABD/EXT exercises >120 degrees  Patient will report <2/10 pain R shoulder FF/ABD up to 100 degrees AROM   Patient will tolerate MIN resist R shoulder activities.     Plan     Continue OT per established POC.    Opal Escobedo, OT

## 2018-08-22 ENCOUNTER — CLINICAL SUPPORT (OUTPATIENT)
Dept: REHABILITATION | Facility: HOSPITAL | Age: 53
End: 2018-08-22
Payer: OTHER MISCELLANEOUS

## 2018-08-22 DIAGNOSIS — S46.011A ROTATOR CUFF STRAIN, RIGHT, INITIAL ENCOUNTER: ICD-10-CM

## 2018-08-22 DIAGNOSIS — S43.101A: Primary | ICD-10-CM

## 2018-08-22 PROCEDURE — 97110 THERAPEUTIC EXERCISES: CPT

## 2018-08-22 PROCEDURE — 97010 HOT OR COLD PACKS THERAPY: CPT

## 2018-08-22 NOTE — PROGRESS NOTES
Occupational Therapy Daily Treatment Note     Name: Vasile QUACH [facility] Number: [id number]    Therapy Diagnosis:   Encounter Diagnoses   Name Primary?    Dislocation of clavicle, closed, right, initial encounter Yes    Rotator cuff strain, right, initial encounter      Physician: William Dyer DO    Visit Date: 8/22/2018  Physician Orders: OT Eval & Treat  Medical Diagnosis: R sternoclavicular dislocation   Evaluation Date: 08/06/2018  Authorization Period Expiration: 6 weeks  Plan of Care Certification Period: 09/14/2018  Visit # 7    Time In: 200  Time Out: 300  Total Billable Time: 45 minutes    Precautions: Standard    Subjective     Pt reports: not doing anything with RUE over the weekend and allowing it to rest, which improved in comfort.  Response to previous treatment: tolerated last treatment with discomfort reported in treatment.       Pain: 2/10  Location: right shoulder/clavicle/neck      Objective   Vasile placed supine on table with towel placed between shoulder blades and received heat to R shoulder/clavicle and to neck x 20 mins. Patient reports the towel between shoulder blades provides comfort in this position.     VASILE received therapeutic exercises to develop endurance, ROM and flexibility for including:  AROM R elbow flex/ext x 20  Pendulums RUE in forward, lateral, clockwise, and counter clockwise  x 30 each   Light digi flex R hand x 25  Isometrics on wall BUE placing forearms and hands flat x 10 seconds x 3 trials    Vasile placed supine on table with towel placed between shoulder blades and received cold pack to R clavicle/shoulder/neck area.      Home Exercises Provided and Patient Education Provided     Education provided:   - emphasis in rest as needed and light duty work activities   -attempted to educate patient with the help of Violet, Employee Health Nurse, regarding procedures in workman's comp and in helping patient to understand restrictions while working as well as easing her  concerns that Jorge was not in agreement with her taking off. She is having a difficult time understanding her situation. She reports that she thought by taking off from work this week, she would be healed. OT emphasized to patient that her injury takes time and can be a couple of months. Patient is fearful she will lose her home if she cannot work. Violet also took patient into her office to complete discussion regarding her work status.       Written Home Exercises Provided: Patient instructed to cont prior HEP.  Exercises were reviewed and VASILE was able to demonstrate them prior to the end of the session.  VASILE demonstrated good  understanding of the education provided.     See EMR under Patient Instructions for exercises provided prior visit.  Patient issued work excuse due to report she feels unable to participate in both therapy and work due to restraints of RUE.    Assessment       Pt prognosis is Good.     Pt will continue to benefit from skilled outpatient occupational therapy to address the deficits listed in the problem list box on initial evaluation, provide pt/family education and to maximize pt's level of independence in the home and community environment.     Pt's spiritual, cultural and educational needs considered and pt agreeable to plan of care and goals.    Anticipated barriers to occupational therapy: pain tolerance    Goals:  Short Term Goals: 3 weeks   Patient will demonstrate independence in performing pendulums as HEP  Patient will verbalize understanding in positioning and sling wear consistently RUE  Patient will report <4/10 pain with ROM R shoulder     Long Term Goals:6 weeks   Patient will tolerate AAROM R shoulder FF/ABD/EXT exercises >120 degrees  Patient will report <2/10 pain R shoulder FF/ABD up to 100 degrees AROM   Patient will tolerate MIN resist R shoulder activities.     Plan     Continue OT per established POC.    Opal Escobedo, OT

## 2018-08-23 DIAGNOSIS — M89.8X1 PAIN OF RIGHT CLAVICLE: Primary | ICD-10-CM

## 2018-08-27 ENCOUNTER — OFFICE VISIT (OUTPATIENT)
Dept: ORTHOPEDICS | Facility: CLINIC | Age: 53
End: 2018-08-27
Payer: OTHER MISCELLANEOUS

## 2018-08-27 VITALS
HEART RATE: 77 BPM | DIASTOLIC BLOOD PRESSURE: 81 MMHG | WEIGHT: 164 LBS | HEIGHT: 63 IN | BODY MASS INDEX: 29.06 KG/M2 | SYSTOLIC BLOOD PRESSURE: 130 MMHG

## 2018-08-27 DIAGNOSIS — S43.101D: Primary | ICD-10-CM

## 2018-08-27 PROCEDURE — 99213 OFFICE O/P EST LOW 20 MIN: CPT | Mod: S$GLB,,, | Performed by: ORTHOPAEDIC SURGERY

## 2018-08-27 PROCEDURE — 99999 PR PBB SHADOW E&M-EST. PATIENT-LVL III: CPT | Mod: PBBFAC,,, | Performed by: ORTHOPAEDIC SURGERY

## 2018-08-27 NOTE — PROGRESS NOTES
Subjective:      Patient ID: Chio Garvey is a 52 y.o. female.    Chief Complaint: Pain of the Right Shoulder    HPI:  Ms. Garvey returned today for re-evaluation of a sternoclavicular spontaneous dislocation of her right clavicle. She originally injured her clavicle on 06/15/2018 when she was placing bags of linen in a bin and had an atraumatic anterior sternoclavicular dislocation of her right dragan chest.  She has been very trying as a patient when trying to discuss appropriate treatment and get her to attend therapy.  She has been given multiple chemotherapeutic agents and with everyone she has excuse why she cannot work as the either cause her to be drowsy or upset her stomach.  She also states that she cannot work because after physical therapy she has too much pain, even though she has been given work restrictions, which restrict the use of her affected extremity.  She has been going to physical therapy and admits that she is improving.  At her last visit she was given tramadol and Naprosyn both of which she states she can't take because the either cause her drowsiness or upset her stomach.  She also stated she does not want to attend physical therapy because she feels it might exacerbate her problems.    ROS:  No new diagnosis/surgery/prescriptions since last visit on 08/10/2018.      Objective:    Physical Exam:  General: AAOx3.  No acute distress  Vascular:  Pulses intact and equal bilaterally.  Capillary refill less than 3 seconds and equal bilaterally  Neurologic:  Pinprick and soft touch intact and equal bilaterally  Integment:  No ecchymosis, no errythema  Extremity:Shoulder:  Forward flexion/abduction equal bilaterally 0/180 degrees. Internal rotation equal bilaterally T10.  Negative lift-off both upper extremity.  External rotation equal bilaterally 0/22 degrees. Full can negative both shoulders.  Empty can negative both right shoulders.  Henson/Neer mildly positive right shoulder.  Cross-arm mildly  positive right shoulder with tenderness at the sternoclavicular joint. Nontender over the acromioclavicular joint both shoulders.  Mild prominence right sternal clavicular joint. Mildly tender with palpation right sternoclavicular joint. Nontender in the bicipital groove both shoulders.  Yergason's negative both shoulders.  Apprehension/relocation negative both shoulders.  Sternoclavicular joint on right side reducible but dislocates after reduction.  Radiography:  No x-rays done today.        Assessment:       Impression:  Sternoclavicular dislocation, right dragan chest.      Plan:       1.  Tried to discuss physical examination with the patient.  She understands that she is improving.  The problem is with all recommended modalities she has an excuse why the cannot be used on her.  Also discussed with the patient that she can work as long as she stays within work restrictions.  Work restrictions have been applied in the past and apparently they are being honored.     2.  Continue with aggressive physical therapy.  The patient stated she could not do physical therapy because it hurts.  Explained to the patient that in order to get rid of the pain she needs to have aggressive physical therapy which will help decrease her pain and improve her motion.  3.  Home exercises were discussed in detail with the patient.  4.  At this point this patient it is 6 weeks from injury and no further narcotic medications are needed. If she has any minor pain it can be treated with over-the-counter medications dosed per box instructions. Since she stated that NSAIDs or of setting her stomach she can use Tylenol.  5.  Return to work with one-handed activities with the left hand only the patient understands she can progressively use her right hand as tolerated.  6.  Follow up in 1 month with an x-ray of her clavicle. If she is not improved may consider referral for a 2nd opinion.  Otherwise she will be nearly 3 months post injury and  should be able to return to full activities at work.

## 2018-08-29 ENCOUNTER — CLINICAL SUPPORT (OUTPATIENT)
Dept: REHABILITATION | Facility: HOSPITAL | Age: 53
End: 2018-08-29
Payer: OTHER MISCELLANEOUS

## 2018-08-29 ENCOUNTER — HOSPITAL ENCOUNTER (EMERGENCY)
Facility: HOSPITAL | Age: 53
Discharge: HOME | End: 2018-08-30
Attending: EMERGENCY MEDICINE
Payer: OTHER MISCELLANEOUS

## 2018-08-29 VITALS
HEIGHT: 63 IN | HEART RATE: 87 BPM | BODY MASS INDEX: 29.23 KG/M2 | SYSTOLIC BLOOD PRESSURE: 162 MMHG | TEMPERATURE: 98 F | WEIGHT: 165 LBS | RESPIRATION RATE: 20 BRPM | DIASTOLIC BLOOD PRESSURE: 89 MMHG | OXYGEN SATURATION: 98 %

## 2018-08-29 DIAGNOSIS — S46.911A SHOULDER STRAIN, RIGHT, INITIAL ENCOUNTER: Primary | ICD-10-CM

## 2018-08-29 DIAGNOSIS — S43.101A: Primary | ICD-10-CM

## 2018-08-29 DIAGNOSIS — S46.011A ROTATOR CUFF STRAIN, RIGHT, INITIAL ENCOUNTER: ICD-10-CM

## 2018-08-29 DIAGNOSIS — M25.519 SHOULDER PAIN: ICD-10-CM

## 2018-08-29 PROCEDURE — 97010 HOT OR COLD PACKS THERAPY: CPT

## 2018-08-29 PROCEDURE — 97110 THERAPEUTIC EXERCISES: CPT

## 2018-08-29 PROCEDURE — 99283 EMERGENCY DEPT VISIT LOW MDM: CPT | Mod: 25

## 2018-08-29 RX ORDER — HYDROCODONE BITARTRATE AND ACETAMINOPHEN 5; 325 MG/1; MG/1
1 TABLET ORAL EVERY 6 HOURS PRN
COMMUNITY
End: 2019-10-09

## 2018-08-29 NOTE — PROGRESS NOTES
Occupational Therapy Daily Treatment Note     Name: Vasile QUACH [facility] Number: [id number]    Therapy Diagnosis:   Encounter Diagnoses   Name Primary?    Dislocation of clavicle, closed, right, initial encounter Yes    Rotator cuff strain, right, initial encounter      Physician: William Dyer DO    Visit Date: 8/29/2018  Physician Orders: OT Eval & Treat  Medical Diagnosis: R sternoclavicular dislocation   Evaluation Date: 08/06/2018  Authorization Period Expiration: 6 weeks  Plan of Care Certification Period: 09/14/2018  Visit # 7    Time In: 200  Time Out: 300  Total Billable Time: 45 minutes    Precautions: Standard    Subjective     Pt reports: feeling like she is being mistreated in this whole process concerning work and workman's comp issues. She is frustrated and emotional in treatment concerning visits with the doctor, therapy visits, and work responsibilities.   Response to previous treatment: tolerated last treatment with discomfort reported in treatment.     Pain: 2/10  Location: right shoulder/clavicle/neck      Objective   Vasile placed supine on table with towel placed between shoulder blades and received heat to R shoulder/clavicle and to neck x 20 mins. Patient reports the towel between shoulder blades provides comfort in this position.     VASILE received therapeutic exercises to develop endurance, ROM and flexibility for including:  AROM R elbow flex/ext x 20  Pendulums RUE in forward, lateral, clockwise, and counter clockwise  x 30 each   Light digi flex R hand x 25  Isometrics on wall BUE placing forearms and hands flat x 10 seconds x 3 trials    Vasile placed supine on table with towel placed between shoulder blades and received cold pack to R clavicle/shoulder/neck area.      Home Exercises Provided and Patient Education Provided     Education provided:   - emphasis in rest as needed and light duty restricted to one handed work activities     Written Home Exercises Provided: Patient instructed to  cont prior HEP.  Exercises were reviewed and VASILE was able to demonstrate them prior to the end of the session.  VASILE demonstrated fair  understanding of the education provided. Vasile is frustrated that she remains in pain and feels she cannot do the skills of her job efficiently using one hand.     See EMR under Patient Instructions for exercises provided prior visit.  Patient issued work excuse due to report she feels unable to participate in both therapy and work due to restraints of RUE.    Assessment       Pt prognosis is Good.     Pt will continue to benefit from skilled outpatient occupational therapy to address the deficits listed in the problem list box on initial evaluation, provide pt/family education and to maximize pt's level of independence in the home and community environment.     Pt's spiritual, cultural and educational needs considered and pt agreeable to plan of care and goals.    Anticipated barriers to occupational therapy: pain tolerance    Goals:  Short Term Goals: 3 weeks   Patient will demonstrate independence in performing pendulums as HEP  Patient will verbalize understanding in positioning and sling wear consistently RUE  Patient will report <4/10 pain with ROM R shoulder     Long Term Goals:6 weeks   Patient will tolerate AAROM R shoulder FF/ABD/EXT exercises >120 degrees  Patient will report <2/10 pain R shoulder FF/ABD up to 100 degrees AROM   Patient will tolerate MIN resist R shoulder activities.     Plan     Continue OT per established POC.    Opal Escobedo, OT

## 2018-08-30 PROCEDURE — 96372 THER/PROPH/DIAG INJ SC/IM: CPT

## 2018-08-30 PROCEDURE — 63600175 PHARM REV CODE 636 W HCPCS: Performed by: EMERGENCY MEDICINE

## 2018-08-30 RX ORDER — KETOROLAC TROMETHAMINE 30 MG/ML
60 INJECTION, SOLUTION INTRAMUSCULAR; INTRAVENOUS ONCE
Status: COMPLETED | OUTPATIENT
Start: 2018-08-30 | End: 2018-08-30

## 2018-08-30 RX ORDER — ASPIRIN 325 MG
325 TABLET ORAL
Status: DISCONTINUED | OUTPATIENT
Start: 2018-08-30 | End: 2018-08-30

## 2018-08-30 RX ADMIN — KETOROLAC TROMETHAMINE 60 MG: 30 INJECTION, SOLUTION INTRAMUSCULAR at 12:08

## 2018-08-30 NOTE — ED PROVIDER NOTES
Encounter Date: 2018       History     Chief Complaint   Patient presents with    Shoulder Pain     Patient sent to emergency room from work after developing severe pain to her right shoulder.  Patient stayed with him pain which was at 10 on scale from 1-10 was an aching sensation.  Patient states that was a lifting garbage arm and developed the severe burning sensation states that have a history of shoulder dislocations in the past felt that his shoulder was out again.  Patient states that the pain was worse pain she has ever felt neck rated at 10 in scale wanted 10 specially with movement          Review of patient's allergies indicates:  No Known Allergies  Past Medical History:   Diagnosis Date    Anxiety     Migraine headache      Past Surgical History:   Procedure Laterality Date     SECTION       Family History   Problem Relation Age of Onset    Hypertension Mother     Pancreatitis Father     Heart attack Father     Panic disorder Sister     Anxiety disorder Sister     No Known Problems Brother     Thyroid disease Sister     Hypertension Sister     No Known Problems Sister     Arthritis Sister     Bipolar disorder Sister      Social History     Tobacco Use    Smoking status: Current Some Day Smoker     Packs/day: 0.50    Smokeless tobacco: Never Used   Substance Use Topics    Alcohol use: Yes     Comment: social    Drug use: No     Review of Systems   Constitutional: Negative for fever.   HENT: Negative for sore throat.    Respiratory: Negative for shortness of breath.    Cardiovascular: Negative for chest pain.   Gastrointestinal: Negative for nausea.   Genitourinary: Negative for dysuria.   Musculoskeletal: Positive for arthralgias. Negative for back pain.   Skin: Negative for rash.   Neurological: Negative for weakness.   Hematological: Does not bruise/bleed easily.   All other systems reviewed and are negative.      Physical Exam     Initial Vitals [18 2337]   BP  Pulse Resp Temp SpO2   (!) 162/89 87 20 97.7 °F (36.5 °C) 98 %      MAP       --         Physical Exam    Nursing note and vitals reviewed.  Constitutional: Vital signs are normal. She appears well-developed and well-nourished. She appears cachectic. She is Obese .   HENT:   Head: Normocephalic and atraumatic.   Eyes: Lids are normal. Lids are everted and swept, no foreign bodies found.   Neck: Trachea normal, normal range of motion and phonation normal. Neck supple.   Cardiovascular: Normal rate, regular rhythm and normal pulses.   Abdominal: Soft. Normal appearance and bowel sounds are normal.   Musculoskeletal: Normal range of motion.   Right shoulder pain tenderness no dislocation or deformities noted good pulses decreased range of motion secondary to pain.   Lymphadenopathy:        Head (right side): No submental, no tonsillar, no preauricular, no posterior auricular and no occipital adenopathy present.        Head (left side): No tonsillar, no preauricular and no posterior auricular adenopathy present.     She has no cervical adenopathy.        Right cervical: No deep cervical adenopathy present.       Left cervical: No superficial cervical adenopathy present.   Neurological: She is alert and oriented to person, place, and time. No cranial nerve deficit or sensory deficit. GCS eye subscore is 4. GCS verbal subscore is 5. GCS motor subscore is 6.   Skin: Skin is warm, dry and intact.   Psychiatric: She has a normal mood and affect. Her speech is normal and behavior is normal. Cognition and memory are normal.         ED Course   Procedures  Labs Reviewed - No data to display       Imaging Results          X-Ray Shoulder Complete 2 View Right (In process)               X-Rays:   Independently Interpreted Readings:   Other Readings:  X-ray of the right shoulder independently read by me showing no acute fracture dislocation    Medical Decision Making:   Initial Assessment:   Patient emerged from from work with  complaints of right shoulder pain after lifting a bicarb drip.  Patient on states the pain was a 10 aching sensation burning in the back.  Differential Diagnosis:   Fracture versus dislocation versus rotator cuff tear  Independently Interpreted Test(s):   I have ordered and independently interpreted X-rays - see prior notes.  Clinical Tests:   Radiological Study: Ordered                      Clinical Impression:   The primary encounter diagnosis was Shoulder strain, right, initial encounter. A diagnosis of Shoulder pain was also pertinent to this visit.                             Lizandro Zimmerman DO  08/30/18 0056

## 2018-08-31 ENCOUNTER — CLINICAL SUPPORT (OUTPATIENT)
Dept: REHABILITATION | Facility: HOSPITAL | Age: 53
End: 2018-08-31
Payer: OTHER MISCELLANEOUS

## 2018-08-31 DIAGNOSIS — S43.101A: Primary | ICD-10-CM

## 2018-08-31 DIAGNOSIS — S46.011A ROTATOR CUFF STRAIN, RIGHT, INITIAL ENCOUNTER: ICD-10-CM

## 2018-08-31 PROCEDURE — 97110 THERAPEUTIC EXERCISES: CPT

## 2018-08-31 PROCEDURE — 97010 HOT OR COLD PACKS THERAPY: CPT

## 2018-08-31 NOTE — PROGRESS NOTES
Occupational Therapy Daily Treatment Note     Name: Vasile QUACH [facility] Number: [id number]    Therapy Diagnosis:   Encounter Diagnoses   Name Primary?    Dislocation of clavicle, closed, right, initial encounter Yes    Rotator cuff strain, right, initial encounter      Physician: William Dyer DO    Visit Date: 8/31/2018  Physician Orders: OT Eval & Treat  Medical Diagnosis: R sternoclavicular dislocation   Evaluation Date: 08/06/2018  Authorization Period Expiration: 6 weeks  Plan of Care Certification Period: 09/14/2018  Visit # 7    Time In: 1100  Time Out: 1200  Total Billable Time: 50 minutes    Precautions: Standard    Subjective     Pt reports: taking Aleve yesterday and having a better day. She also reports having to receive a shot at the ER during work this week due to her RUE hurting while performing job tasks. OT discussed with patient the need to adhere strictly to restricted use of RUE.   Response to previous treatment: tolerated last treatment with discomfort reported in treatment.     Pain: 2/10  Location: right shoulder/clavicle/neck      Objective   Vasile placed supine on table with towel placed between shoulder blades and received heat to R shoulder/clavicle and to neck x 20 mins. Patient reports the towel between shoulder blades provides comfort in this position.     VASILE received therapeutic exercises to develop endurance, ROM and flexibility for including:  AROM R elbow flex/ext x 20  Pendulums RUE in forward, lateral, clockwise, and counter clockwise  x 30 each   Light digi flex R hand x 25  Isometrics on wall BUE placing forearms and hands flat x 10 seconds x 3 trials  AROM BUE FF <90 x 6 trials     OT adapted patient's sling to provide a swathe to R upper arm to provide improved support due to patient reporting it feels like it slides around in the sling.       Home Exercises Provided and Patient Education Provided     Education provided:   - emphasis in rest as needed and light duty  restricted to one handed work activities     Written Home Exercises Provided: Patient instructed to cont prior HEP.  Exercises were reviewed and VASILE was able to demonstrate them prior to the end of the session.  VASILE demonstrated fair  understanding of the education provided. Vasile is frustrated that she remains in pain and feels she cannot do the skills of her job efficiently using one hand.     See EMR under Patient Instructions for exercises provided prior visit.  Patient issued work excuse due to report she feels unable to participate in both therapy and work due to restraints of RUE.    Assessment       Pt prognosis is Good.     Pt will continue to benefit from skilled outpatient occupational therapy to address the deficits listed in the problem list box on initial evaluation, provide pt/family education and to maximize pt's level of independence in the home and community environment.     Pt's spiritual, cultural and educational needs considered and pt agreeable to plan of care and goals.    Anticipated barriers to occupational therapy: pain tolerance    Goals:  Short Term Goals: 3 weeks   Patient will demonstrate independence in performing pendulums as HEP  Patient will verbalize understanding in positioning and sling wear consistently RUE  Patient will report <4/10 pain with ROM R shoulder     Long Term Goals:6 weeks   Patient will tolerate AAROM R shoulder FF/ABD/EXT exercises >120 degrees  Patient will report <2/10 pain R shoulder FF/ABD up to 100 degrees AROM   Patient will tolerate MIN resist R shoulder activities.     Plan     Continue OT per established POC.    Opal Escobedo OT

## 2018-09-05 ENCOUNTER — CLINICAL SUPPORT (OUTPATIENT)
Dept: REHABILITATION | Facility: HOSPITAL | Age: 53
End: 2018-09-05
Payer: OTHER MISCELLANEOUS

## 2018-09-05 PROCEDURE — 97110 THERAPEUTIC EXERCISES: CPT

## 2018-09-05 PROCEDURE — 97010 HOT OR COLD PACKS THERAPY: CPT

## 2018-09-05 NOTE — PROGRESS NOTES
Occupational Therapy Daily Treatment Note     Name: Vasile QUACH [facility] Number: [id number]    Therapy Diagnosis:   No diagnosis found.  Physician: William Dyer DO    Visit Date: 9/5/2018  Physician Orders: OT Eval & Treat  Medical Diagnosis: R sternoclavicular dislocation   Evaluation Date: 08/06/2018  Authorization Period Expiration: 6 weeks  Plan of Care Certification Period: 09/14/2018  Visit # 11    Time In: 200  Time Out: 245  Total Billable Time: 45 minutes    Precautions: Standard    Subjective     Pt reports: continuous pain affecting R shoulder, neck, and sternoclavicular areas. Patient reports having to go to the ER recently on her work shift to receive pain medication due to severe pain. They also gave her an immobilizer for R shoulder which she reports is much better than the previous sling.   Response to previous treatment: patient is not tolerating occupational therapy visits very well. She cries throughout treatments and is only tolerating limited ROM/exercises RUE.     Pain: 5/10  Location: right shoulder/clavicle/neck  With report she needs to take Aleve now because it has worn off.     Objective   Vasile placed supine on table with towel placed between shoulder blades and received heat to R shoulder/clavicle and to neck x 15 mins. Patient does report this position is comfortable.     VASILE received therapeutic exercises to develop endurance, ROM and flexibility for including:  Light digi-flex R hand x 25  AROM R elbow flex/ext x 20  AROM BUE FF < 90 degrees x 6 trials with patient becoming tearful and reporting she is in too much pain today.    OT reapplied RUE immobilizer and patient reports she is going to take her Aleve prior to her shift.       Home Exercises Provided and Patient Education Provided     Education provided:   - emphasis in rest as needed and light duty restricted to one handed work activities     Written Home Exercises Provided: Patient instructed to cont prior HEP.  Exercises  were reviewed and VASILE was able to demonstrate them prior to the end of the session.  VASILE demonstrated fair  understanding of the education provided. Vasile is frustrated that she remains in pain and feels she cannot do the skills of her job efficiently using one hand.     See EMR under Patient Instructions for exercises provided prior visit.  Patient issued work excuse due to report she feels unable to participate in both therapy and work due to restraints of RUE.    Assessment       Pt prognosis is Guarded.     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 Patient is reporting pain that affects daily activities and she does not feel she is making gains. In therapy, we are unable to aggressively perform ROM and exercises due to patient's limitation in tolerance. If possible, would recommend patient receive permission to take time off through workmans comp per MD approval to allow patient to rest and provide optimal healing with continuation of therapy during this time. Patient demonstrates compliance in attending visits regularly.     Pt's spiritual, cultural and educational needs considered and pt agreeable to plan of care and goals.    Anticipated barriers to occupational therapy: pain tolerance    Goals:  Short Term Goals: 3 weeks   Patient will demonstrate independence in performing pendulums as HEP  Patient will verbalize understanding in positioning and sling wear consistently RUE  Patient will report <4/10 pain with ROM R shoulder     Long Term Goals:6 weeks   Patient will tolerate AAROM R shoulder FF/ABD/EXT exercises >120 degrees  Patient will report <2/10 pain R shoulder FF/ABD up to 100 degrees AROM   Patient will tolerate MIN resist R shoulder activities.     Plan     Continue OT per established POC.     Opal Escobedo OT

## 2018-09-07 ENCOUNTER — CLINICAL SUPPORT (OUTPATIENT)
Dept: REHABILITATION | Facility: HOSPITAL | Age: 53
End: 2018-09-07
Payer: OTHER MISCELLANEOUS

## 2018-09-07 DIAGNOSIS — S46.011A ROTATOR CUFF STRAIN, RIGHT, INITIAL ENCOUNTER: ICD-10-CM

## 2018-09-07 DIAGNOSIS — S43.101A: Primary | ICD-10-CM

## 2018-09-07 PROCEDURE — 97010 HOT OR COLD PACKS THERAPY: CPT

## 2018-09-07 PROCEDURE — 97110 THERAPEUTIC EXERCISES: CPT

## 2018-09-07 NOTE — PROGRESS NOTES
Occupational Therapy Daily Treatment Note     Name: Vasile QUACH [facility] Number: [id number]    Therapy Diagnosis:   Encounter Diagnoses   Name Primary?    Dislocation of clavicle, closed, right, initial encounter Yes    Rotator cuff strain, right, initial encounter      Physician: William Dyer DO    Visit Date: 9/7/2018  Physician Orders: OT Eval & Treat  Medical Diagnosis: R sternoclavicular dislocation   Evaluation Date: 08/06/2018  Authorization Period Expiration: 6 weeks  Plan of Care Certification Period: 09/14/2018  Visit # 12    Time In: 1100  Time Out: 1205  Total Billable Time: 60 minutes    Precautions: Standard    Subjective     Pt reports: pain that continues to limit her daily tasks. She becomes tearful in treatments.   Response to previous treatment: patient is not tolerating occupational therapy visits very well. She cries throughout treatments and is only tolerating limited ROM/exercises RUE.     Pain: 5/10  Location: right shoulder/clavicle/neck      Objective   Vasile placed supine on table with towel placed between shoulder blades and received heat to R shoulder/clavicle and to neck x 15 mins. Patient does report this position is comfortable.     VASILE received therapeutic exercises to develop endurance, ROM and flexibility for including:  Light digi-flex R hand 2 x 25  RUE pendulums RUE forward, lateral, clockwise, and counter clockwise 2 x 30  AROM BUE elbow flex/ext x 10 with 1# dowel  Isometrics on wall BUE forearm and palm weightbearing x 5 at 10 seconds each    Ice application x 10 mins R shoulder following treatment.     OT reapplied RUE immobilizer and patient reports she is going to take her Aleve prior to her shift.     Home Exercises Provided and Patient Education Provided     Education provided:   - emphasis in rest as needed and light duty restricted to one handed work activities     Written Home Exercises Provided: Patient instructed to cont prior HEP.  Exercises were reviewed and  VASILE was able to demonstrate them prior to the end of the session.  VASILE demonstrated fair  understanding of the education provided. Vasile is frustrated that she remains in pain and feels she cannot do the skills of her job efficiently using one hand.     See EMR under Patient Instructions for exercises provided prior visit.  Patient issued work excuse due to report she feels unable to participate in both therapy and work due to restraints of RUE.    Assessment       Pt prognosis is Guarded.     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 Patient is reporting pain that affects daily activities and she does not feel she is making gains. In therapy, we are unable to aggressively perform ROM and exercises due to patient's limitation in tolerance. If possible, would recommend patient receive permission to take time off through workmans comp per MD approval to allow patient to rest and provide optimal healing with continuation of therapy during this time. Patient demonstrates compliance in attending visits regularly.     Pt's spiritual, cultural and educational needs considered and pt agreeable to plan of care and goals.    Anticipated barriers to occupational therapy: pain tolerance    Goals:  Short Term Goals: 3 weeks   Patient will demonstrate independence in performing pendulums as HEP  Patient will verbalize understanding in positioning and sling wear consistently RUE  Patient will report <4/10 pain with ROM R shoulder     Long Term Goals:6 weeks   Patient will tolerate AAROM R shoulder FF/ABD/EXT exercises >120 degrees  Patient will report <2/10 pain R shoulder FF/ABD up to 100 degrees AROM   Patient will tolerate MIN resist R shoulder activities.     Plan     Continue OT per established POC.     Opal Escobedo, CLAIRE

## 2018-09-10 ENCOUNTER — CLINICAL SUPPORT (OUTPATIENT)
Dept: REHABILITATION | Facility: HOSPITAL | Age: 53
End: 2018-09-10
Payer: OTHER MISCELLANEOUS

## 2018-09-10 DIAGNOSIS — S46.011A ROTATOR CUFF STRAIN, RIGHT, INITIAL ENCOUNTER: Primary | ICD-10-CM

## 2018-09-10 DIAGNOSIS — S43.101D: ICD-10-CM

## 2018-09-10 PROBLEM — S43.102D: Status: ACTIVE | Noted: 2018-08-03

## 2018-09-10 PROCEDURE — 97110 THERAPEUTIC EXERCISES: CPT

## 2018-09-10 PROCEDURE — 97010 HOT OR COLD PACKS THERAPY: CPT

## 2018-09-10 NOTE — PROGRESS NOTES
Occupational Therapy Daily Treatment Note     Name: Vasile QUACH [facility] Number: [id number]    Therapy Diagnosis:   Encounter Diagnoses   Name Primary?    Rotator cuff strain, right, initial encounter Yes    Dislocation of clavicle, closed, right, subsequent encounter      Physician: William Dyer DO    Visit Date: 9/10/2018  Physician Orders: OT Eval & Treat  Medical Diagnosis: R sternoclavicular dislocation   Evaluation Date: 08/06/2018  Authorization Period Expiration: 6 weeks  Plan of Care Certification Period: 09/14/2018  Visit # 12    Time In: 210  Time Out: 300  Total Billable Time: 45 minutes    Precautions: Standard    Subjective     Pt reports: continued pain/burning sensation R neck/shoulder area  Response to previous treatment: patient is not tolerating occupational therapy visits very well. She cries throughout treatments and is only tolerating limited ROM/exercises RUE.     Pain: 5/10  Location: right shoulder/clavicle/neck      Objective   Vasile placed supine on table and received heat to posterior shoulder blades/neck and R shoulder/clavicle  x 20 mins. Patient does report this position is comfortable.     VASILE received therapeutic exercises to develop endurance, ROM and flexibility for including:    RUE pendulums RUE forward, lateral, clockwise, and counter clockwise 2 x 30  Isometrics on wall BUE forearm and palm weightbearing x 5 at 10 seconds each    OT reapplied RUE immobilizer and patient reports she is going to take her Aleve prior to her shift.     Home Exercises Provided and Patient Education Provided     Education provided:   - emphasis in rest as needed and light duty restricted to one handed work activities     Written Home Exercises Provided: Patient instructed to cont prior HEP.  Exercises were reviewed and VASILE was able to demonstrate them prior to the end of the session.  VASILE demonstrated fair  understanding of the education provided. Vasile is frustrated that she remains in pain and  feels she cannot do the skills of her job efficiently using one hand.     See EMR under Patient Instructions for exercises provided prior visit.  Patient issued work excuse due to report she feels unable to participate in both therapy and work due to restraints of RUE.    Assessment       Pt prognosis is Guarded.     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 Patient is reporting pain that affects daily activities and she does not feel she is making gains. In therapy, we are unable to aggressively perform ROM and exercises due to patient's limitation in tolerance. If possible, would recommend patient receive permission to take time off through workmans comp per MD approval to allow patient to rest and provide optimal healing with continuation of therapy during this time. Patient demonstrates compliance in attending visits regularly.     Pt's spiritual, cultural and educational needs considered and pt agreeable to plan of care and goals.    Anticipated barriers to occupational therapy: pain tolerance    Goals:  Short Term Goals: 3 weeks   Patient will demonstrate independence in performing pendulums as HEP  Patient will verbalize understanding in positioning and sling wear consistently RUE  Patient will report <4/10 pain with ROM R shoulder     Long Term Goals:6 weeks   Patient will tolerate AAROM R shoulder FF/ABD/EXT exercises >120 degrees  Patient will report <2/10 pain R shoulder FF/ABD up to 100 degrees AROM   Patient will tolerate MIN resist R shoulder activities.     Plan     Continue OT per established POC.     Opal Escobedo, OT

## 2018-09-12 ENCOUNTER — CLINICAL SUPPORT (OUTPATIENT)
Dept: REHABILITATION | Facility: HOSPITAL | Age: 53
End: 2018-09-12
Payer: OTHER MISCELLANEOUS

## 2018-09-12 DIAGNOSIS — S46.011A ROTATOR CUFF STRAIN, RIGHT, INITIAL ENCOUNTER: ICD-10-CM

## 2018-09-12 DIAGNOSIS — S43.101D: Primary | ICD-10-CM

## 2018-09-12 PROCEDURE — 97110 THERAPEUTIC EXERCISES: CPT

## 2018-09-12 PROCEDURE — 97010 HOT OR COLD PACKS THERAPY: CPT

## 2018-09-12 NOTE — PROGRESS NOTES
Occupational Therapy Daily Treatment Note     Name: Vasile QUACH [facility] Number: [id number]    Therapy Diagnosis:   Encounter Diagnoses   Name Primary?    Dislocation of clavicle, closed, right, subsequent encounter Yes    Rotator cuff strain, right, initial encounter      Physician: William Dyer DO    Visit Date: 9/12/2018  Physician Orders: OT Eval & Treat  Medical Diagnosis: R sternoclavicular dislocation   Evaluation Date: 08/06/2018  Authorization Period Expiration: 6 weeks  Plan of Care Certification Period: 09/14/2018  Visit # 12    Time In: 200  Time Out: 300  Total Billable Time: 45 minutes    Precautions: Standard    Subjective     Pt reports: continued pain/burning sensation R neck/shoulder area  Response to previous treatment: patient is not tolerating occupational therapy visits very well. She cries throughout treatments and is only tolerating limited ROM/exercises RUE.     Pain: 5/10  Location: right shoulder/clavicle/neck      Objective   Vasile placed supine on table and received heat to posterior shoulder blades/neck and R shoulder/clavicle  x 20 mins. Patient does report this position is comfortable.     VASILE received therapeutic exercises to develop endurance, ROM and flexibility for including:    RUE pendulums RUE forward, lateral, clockwise, and counter clockwise 2 x 30  Isometrics on wall BUE forearm and palm weightbearing x 5 at 10 seconds each  AROM R elbow flex/ext x 20  B shoulder shrugs as tolerated x 10  Moderate resist digi flex R hand x 25    Home Exercises Provided and Patient Education Provided     Education provided:   - emphasis in rest as needed and light duty restricted to one handed work activities     Written Home Exercises Provided: Patient instructed to cont prior HEP.  Exercises were reviewed and VASILE was able to demonstrate them prior to the end of the session.  VASILE demonstrated fair  understanding of the education provided. Vasile is frustrated that she remains in pain and  feels she cannot do the skills of her job efficiently using one hand.     See EMR under Patient Instructions for exercises provided prior visit.  Patient issued work excuse due to report she feels unable to participate in both therapy and work due to restraints of RUE.    Assessment       Pt prognosis is Guarded.     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 Patient is reporting pain that affects daily activities and she does not feel she is making gains. In therapy, we are unable to aggressively perform ROM and exercises due to patient's limitation in tolerance. If possible, would recommend patient receive permission to take time off through workmans comp per MD approval to allow patient to rest and provide optimal healing with continuation of therapy during this time. Patient demonstrates compliance in attending visits regularly.     Pt's spiritual, cultural and educational needs considered and pt agreeable to plan of care and goals.    Anticipated barriers to occupational therapy: pain tolerance    Goals:  Short Term Goals: 3 weeks   Patient will demonstrate independence in performing pendulums as HEP  Patient will verbalize understanding in positioning and sling wear consistently RUE  Patient will report <4/10 pain with ROM R shoulder     Long Term Goals:6 weeks   Patient will tolerate AAROM R shoulder FF/ABD/EXT exercises >120 degrees  Patient will report <2/10 pain R shoulder FF/ABD up to 100 degrees AROM   Patient will tolerate MIN resist R shoulder activities.     Plan     Continue OT per established POC.     Opal Escobedo, OT

## 2018-09-14 ENCOUNTER — CLINICAL SUPPORT (OUTPATIENT)
Dept: REHABILITATION | Facility: HOSPITAL | Age: 53
End: 2018-09-14
Payer: OTHER MISCELLANEOUS

## 2018-09-14 DIAGNOSIS — S46.011A ROTATOR CUFF STRAIN, RIGHT, INITIAL ENCOUNTER: ICD-10-CM

## 2018-09-14 DIAGNOSIS — S43.101D: Primary | ICD-10-CM

## 2018-09-14 PROCEDURE — 97010 HOT OR COLD PACKS THERAPY: CPT

## 2018-09-14 PROCEDURE — 97110 THERAPEUTIC EXERCISES: CPT

## 2018-09-14 NOTE — PROGRESS NOTES
Occupational Therapy Daily Treatment Note     Name: Vasile QUACH [facility] Number: [id number]    Therapy Diagnosis:   Encounter Diagnoses   Name Primary?    Dislocation of clavicle, closed, right, subsequent encounter Yes    Rotator cuff strain, right, initial encounter      Physician: William Dyer DO    Visit Date: 9/14/2018  Physician Orders: OT Eval & Treat  Medical Diagnosis: R sternoclavicular dislocation   Evaluation Date: 08/06/2018  Authorization Period Expiration: 6 weeks  Plan of Care Certification Period: 09/14/2018  Visit # 13  Time In: 1100  Time Out: 1200  Total Billable Time: 55 minutes    Precautions: Standard    Subjective     Pt reports: continued pain/burning sensation R neck/shoulder area  Response to previous treatment: patient is not tolerating occupational therapy visits very well. She cries throughout treatments and is only tolerating limited ROM/exercises RUE.     Pain: 5/10  Location: right shoulder/clavicle/neck      Objective   Vasile placed supine on table and received heat to posterior shoulder blades/neck and R shoulder/clavicle  x 20 mins. Patient does report this position is comfortable.     VASILE received therapeutic exercises to develop endurance, ROM and flexibility for including:    RUE pendulums RUE forward, lateral, clockwise, and counter clockwise 2 x 30  Isometrics on wall BUE forearm and palm weightbearing x 5 at 10 seconds each  AROM R elbow flex/ext x 20  B shoulder shrugs as tolerated x 10  Moderate resist digi flex R hand x 25    Home Exercises Provided and Patient Education Provided     Education provided:   - emphasis in rest as needed and light duty restricted to one handed work activities     Written Home Exercises Provided: Patient instructed to cont prior HEP.  Exercises were reviewed and VASILE was able to demonstrate them prior to the end of the session.  VASILE demonstrated fair  understanding of the education provided. Vasile is frustrated that she remains in pain and  feels she cannot do the skills of her job efficiently using one hand.     See EMR under Patient Instructions for exercises provided prior visit.  Patient issued work excuse due to report she feels unable to participate in both therapy and work due to restraints of RUE.    Assessment       Pt prognosis is Guarded.     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 Patient is reporting pain that affects daily activities and she does not feel she is making gains. In therapy, we are unable to aggressively perform ROM and exercises due to patient's limitation in tolerance. If possible, would recommend patient receive permission to take time off through workmans comp per MD approval to allow patient to rest and provide optimal healing with continuation of therapy during this time. Patient demonstrates compliance in attending visits regularly.     Pt's spiritual, cultural and educational needs considered and pt agreeable to plan of care and goals.    Anticipated barriers to occupational therapy: pain tolerance    Goals:  Short Term Goals: 3 weeks   Patient will demonstrate independence in performing pendulums as HEP  Patient will verbalize understanding in positioning and sling wear consistently RUE  Patient will report <4/10 pain with ROM R shoulder     Long Term Goals:6 weeks   Patient will tolerate AAROM R shoulder FF/ABD/EXT exercises >120 degrees  Patient will report <2/10 pain R shoulder FF/ABD up to 100 degrees AROM   Patient will tolerate MIN resist R shoulder activities.     Plan     Continue OT per established POC.     Opal Escobedo, OT

## 2018-09-17 ENCOUNTER — CLINICAL SUPPORT (OUTPATIENT)
Dept: REHABILITATION | Facility: HOSPITAL | Age: 53
End: 2018-09-17
Payer: OTHER MISCELLANEOUS

## 2018-09-17 DIAGNOSIS — S46.011A ROTATOR CUFF STRAIN, RIGHT, INITIAL ENCOUNTER: ICD-10-CM

## 2018-09-17 DIAGNOSIS — S43.101D: Primary | ICD-10-CM

## 2018-09-17 PROCEDURE — 97010 HOT OR COLD PACKS THERAPY: CPT

## 2018-09-17 PROCEDURE — 97110 THERAPEUTIC EXERCISES: CPT

## 2018-09-17 NOTE — PROGRESS NOTES
Occupational Therapy Daily Treatment Note     Name: Vasile QUACH [facility] Number: [id number]    Therapy Diagnosis:   Encounter Diagnoses   Name Primary?    Dislocation of clavicle, closed, right, subsequent encounter Yes    Rotator cuff strain, right, initial encounter      Physician: William Dyer DO    Visit Date: 9/17/2018  Physician Orders: OT Eval & Treat  Medical Diagnosis: R sternoclavicular dislocation   Evaluation Date: 08/06/2018  Authorization Period Expiration: 6 weeks  Plan of Care Certification Period: 09/14/2018  Visit # 13  Time In: 1100  Time Out: 1200  Total Billable Time: 55 minutes    Precautions: Standard    Subjective     Pt reports: continued pain/burning sensation R neck/shoulder area  Response to previous treatment: patient is not tolerating occupational therapy visits very well. She cries throughout treatments and is only tolerating limited ROM/exercises RUE.     Pain: 5/10  Location: right shoulder/clavicle/neck      Objective   Vasile placed supine on table and received heat to posterior shoulder blades/neck and R shoulder/clavicle  x 20 mins. Patient does report this position is comfortable.     VASILE received therapeutic exercises to develop endurance, ROM and flexibility for including:    RUE pendulums RUE forward, lateral, clockwise, and counter clockwise x 30 each  Isometrics on wall BUE forearm and palm weightbearing x 5 at 10 seconds each  AROM R elbow flex/ext x 20  B shoulder shrugs as tolerated x 15  Moderate resist digi flex R hand x 25    Home Exercises Provided and Patient Education Provided     Education provided:   - emphasis in rest as needed and light duty restricted to one handed work activities     Written Home Exercises Provided: Patient instructed to cont prior HEP.  Exercises were reviewed and VASILE was able to demonstrate them prior to the end of the session.  VASILE demonstrated fair  understanding of the education provided. Vasile is frustrated that she remains in pain and  feels she cannot do the skills of her job efficiently using one hand.     See EMR under Patient Instructions for exercises provided prior visit.  Patient issued work excuse due to report she feels unable to participate in both therapy and work due to restraints of RUE.    Assessment       Pt prognosis is Guarded.     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 Patient is reporting pain that affects daily activities and she does not feel she is making gains. In therapy, we are have made very little progression due to limitation in patient's tolerance. Patient cries in therapy sessions and is very uncomfortable. OT is recommending patient seek second opinion due to lack of progression in treatment.     Pt's spiritual, cultural and educational needs considered and pt agreeable to plan of care and goals.    Anticipated barriers to occupational therapy: pain tolerance    Goals:  Short Term Goals: 3 weeks   Patient will demonstrate independence in performing pendulums as HEP  Patient will verbalize understanding in positioning and sling wear consistently RUE  Patient will report <4/10 pain with ROM R shoulder     Long Term Goals:6 weeks   Patient will tolerate AAROM R shoulder FF/ABD/EXT exercises >120 degrees  Patient will report <2/10 pain R shoulder FF/ABD up to 100 degrees AROM   Patient will tolerate MIN resist R shoulder activities.     Plan     Recommend patient seek second orthopaedic opinion or further testing, due to lack of progression in therapy. Patient is not tolerating therapy and has been coming for 6 weeks.      Opal Escobedo, OT

## 2018-09-19 DIAGNOSIS — M89.8X1 PAIN OF RIGHT CLAVICLE: Primary | ICD-10-CM

## 2018-09-21 ENCOUNTER — CLINICAL SUPPORT (OUTPATIENT)
Dept: REHABILITATION | Facility: HOSPITAL | Age: 53
End: 2018-09-21
Payer: OTHER MISCELLANEOUS

## 2018-09-21 DIAGNOSIS — S46.011A ROTATOR CUFF STRAIN, RIGHT, INITIAL ENCOUNTER: ICD-10-CM

## 2018-09-21 DIAGNOSIS — S43.101D: Primary | ICD-10-CM

## 2018-09-21 PROCEDURE — 97110 THERAPEUTIC EXERCISES: CPT

## 2018-09-21 PROCEDURE — 97010 HOT OR COLD PACKS THERAPY: CPT

## 2018-09-21 NOTE — PROGRESS NOTES
Occupational Therapy Daily Treatment Note     Name: Vasile QUACH [facility] Number: [id number]    Therapy Diagnosis:   Encounter Diagnoses   Name Primary?    Dislocation of clavicle, closed, right, subsequent encounter Yes    Rotator cuff strain, right, initial encounter      Physician: William Dyer DO    Visit Date: 9/21/2018  Physician Orders: OT Eval & Treat  Medical Diagnosis: R sternoclavicular dislocation   Evaluation Date: 08/06/2018  Authorization Period Expiration: 6 weeks  Plan of Care Certification Period: 09/14/2018  Visit # 13  Time In: 1100  Time Out: 1200  Total Billable Time:45 minutes    Precautions: Standard    Subjective     Pt reports: continued pain/burning sensation R neck/shoulder area  Response to previous treatment: patient is not tolerating occupational therapy visits very well. She cries throughout treatments and is only tolerating limited ROM/exercises RUE.     Pain: 5/10  Location: right shoulder/clavicle/neck      Objective   Vasile placed supine on table and received heat to posterior shoulder blades/neck and R shoulder/clavicle  x 20 mins. Patient does report this position is comfortable.     VASILE received therapeutic exercises to develop endurance, ROM and flexibility for including:    RUE pendulums RUE forward, lateral, clockwise, and counter clockwise x 30 each  Isometrics on wall BUE forearm and palm weightbearing x 4 at 10 seconds each  AROM R elbow flex/ext x 20  B shoulder shrugs as tolerated x 20  Moderate resist digi flex R hand 2 x 25    Home Exercises Provided and Patient Education Provided     Education provided:   - emphasis in rest as needed and light duty restricted to one handed work activities     Written Home Exercises Provided: Patient instructed to cont prior HEP.  Exercises were reviewed and VASILE was able to demonstrate them prior to the end of the session.  VASILE demonstrated fair  understanding of the education provided. Vasile is frustrated that she remains in pain  and feels she cannot do the skills of her job efficiently using one hand.     See EMR under Patient Instructions for exercises provided prior visit.  Patient issued work excuse due to report she feels unable to participate in both therapy and work due to restraints of RUE.    Assessment       Pt prognosis is Guarded.     Patient has made limited improvements in tolerance and ROM in 6 weeks of therapy. Patient is planning to see a new MD for second opinion. Discussed with patient the need to receive new orders following MD recommendations in order to proceed with further treatment.     Pt's spiritual, cultural and educational needs considered and pt agreeable to plan of care and goals.    Anticipated barriers to occupational therapy: pain tolerance    Goals:  Short Term Goals: 3 weeks   Patient will demonstrate independence in performing pendulums as HEP  Patient will verbalize understanding in positioning and sling wear consistently RUE  Patient will report <4/10 pain with ROM R shoulder     Long Term Goals:6 weeks   Patient will tolerate AAROM R shoulder FF/ABD/EXT exercises >120 degrees  Patient will report <2/10 pain R shoulder FF/ABD up to 100 degrees AROM   Patient will tolerate MIN resist R shoulder activities.     Plan     Recommend patient seek second orthopaedic opinion or further testing, due to lack of progression in therapy. Patient is not tolerating therapy and has been coming for 6 weeks.      Opal Escobedo, OT

## 2018-10-19 ENCOUNTER — HOSPITAL ENCOUNTER (EMERGENCY)
Facility: HOSPITAL | Age: 53
Discharge: HOME | End: 2018-10-19
Payer: COMMERCIAL

## 2018-10-19 VITALS
HEIGHT: 63 IN | BODY MASS INDEX: 29.23 KG/M2 | OXYGEN SATURATION: 100 % | TEMPERATURE: 98 F | DIASTOLIC BLOOD PRESSURE: 97 MMHG | SYSTOLIC BLOOD PRESSURE: 164 MMHG | HEART RATE: 81 BPM | RESPIRATION RATE: 20 BRPM | WEIGHT: 165 LBS

## 2018-10-19 DIAGNOSIS — S56.912A MUSCLE STRAIN OF LEFT FOREARM, INITIAL ENCOUNTER: Primary | ICD-10-CM

## 2018-10-19 DIAGNOSIS — T14.90XA INJURY: ICD-10-CM

## 2018-10-19 PROCEDURE — 99283 EMERGENCY DEPT VISIT LOW MDM: CPT | Mod: 25

## 2018-10-19 PROCEDURE — 25000003 PHARM REV CODE 250: Performed by: NURSE PRACTITIONER

## 2018-10-19 PROCEDURE — 73090 X-RAY EXAM OF FOREARM: CPT | Mod: TC,FY,LT

## 2018-10-19 PROCEDURE — 73090 X-RAY EXAM OF FOREARM: CPT | Mod: 26,LT,, | Performed by: RADIOLOGY

## 2018-10-19 RX ORDER — IBUPROFEN 800 MG/1
800 TABLET ORAL EVERY 6 HOURS PRN
Qty: 20 TABLET | Refills: 0 | Status: SHIPPED | OUTPATIENT
Start: 2018-10-19 | End: 2019-08-30

## 2018-10-19 RX ORDER — IBUPROFEN 400 MG/1
800 TABLET ORAL
Status: COMPLETED | OUTPATIENT
Start: 2018-10-19 | End: 2018-10-19

## 2018-10-19 RX ADMIN — IBUPROFEN 800 MG: 400 TABLET ORAL at 06:10

## 2018-10-20 NOTE — ED PROVIDER NOTES
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 Patient was attempting to throw trash into dumpster and felt sudden pain in left forearm  Patient injured right clavicle and rotator cuff strain in  while attempting to throw trash in dumpster at work  Patient was seen in the ED  for right shoulder strain after attempting to throw trash in dumpster at work  Patient with sling and swathe in place right upper extremity.  No obvious injury to left forearm.             Review of patient's allergies indicates:  No Known Allergies  Past Medical History:   Diagnosis Date    Anxiety     Migraine headache      Past Surgical History:   Procedure Laterality Date     SECTION       Family History   Problem Relation Age of Onset    Hypertension Mother     Pancreatitis Father     Heart attack Father     Panic disorder Sister     Anxiety disorder Sister     No Known Problems Brother     Thyroid disease Sister     Hypertension Sister     No Known Problems Sister     Arthritis Sister     Bipolar disorder Sister      Social History     Tobacco Use    Smoking status: Current Some Day Smoker     Packs/day: 0.50    Smokeless tobacco: Never Used   Substance Use Topics    Alcohol use: Yes     Comment: social    Drug use: No     Review of Systems   Constitutional: Negative for fever.   HENT: Negative for sore throat.    Respiratory: Negative for shortness of breath.    Cardiovascular: Negative for chest pain.   Gastrointestinal: Negative for nausea.   Genitourinary: Negative for dysuria.   Musculoskeletal: Positive for arthralgias (left forearm pain). Negative for back pain.   Skin: Negative for rash.   Neurological: Negative for  weakness.   Hematological: Does not bruise/bleed easily.   All other systems reviewed and are negative.      Physical Exam     Initial Vitals [10/19/18 1819]   BP Pulse Resp Temp SpO2   (!) 164/97 81 20 98 °F (36.7 °C) 100 %      MAP       --         Physical Exam    Nursing note and vitals reviewed.  Constitutional: She appears well-developed and well-nourished.   HENT:   Head: Normocephalic.   Eyes: Pupils are equal, round, and reactive to light.   Neck: Normal range of motion.   Cardiovascular: Normal rate, regular rhythm and normal heart sounds.   Pulmonary/Chest: Breath sounds normal.   Musculoskeletal: Normal range of motion. She exhibits tenderness.        Right forearm: She exhibits tenderness and bony tenderness. She exhibits no swelling, no edema, no deformity and no laceration.   Neurological: She is alert and oriented to person, place, and time.   Skin: Skin is warm and dry.   Psychiatric: She has a normal mood and affect. Her behavior is normal. Judgment and thought content normal.         ED Course   Procedures  Labs Reviewed - No data to display       Imaging Results          X-Ray Forearm Left (In process)                  Medical Decision Making:   Initial Assessment:   Patient with sudden right forearm pain after attempting to throw trash bag in dumpster while at work  Differential Diagnosis:   Right forearm fracture  Right forearm strain     ED Management:  Motrin for pain  XR right forearm negative fracture per Dr. Estrada    Discussed physical exam findings with patient  No acute emergent medication condition identified at this time to warrant further testing/diagnostics.  Plan to discharge patient home with instructions to follow-up with PCP in 2-5 days or return to ED if symptoms worsen.  Patient verbalized agreement to discharge treatment plan.                          Clinical Impression:   The primary encounter diagnosis was Muscle strain of left forearm, initial encounter. A diagnosis of  Injury was also pertinent to this visit.                             Symone Saravia NP  10/19/18 1950

## 2019-08-30 ENCOUNTER — HOSPITAL ENCOUNTER (EMERGENCY)
Facility: HOSPITAL | Age: 54
Discharge: HOME | End: 2019-08-30
Attending: EMERGENCY MEDICINE
Payer: OTHER MISCELLANEOUS

## 2019-08-30 VITALS
BODY MASS INDEX: 31.18 KG/M2 | SYSTOLIC BLOOD PRESSURE: 133 MMHG | TEMPERATURE: 99 F | RESPIRATION RATE: 20 BRPM | HEART RATE: 70 BPM | DIASTOLIC BLOOD PRESSURE: 86 MMHG | OXYGEN SATURATION: 99 % | WEIGHT: 176 LBS | HEIGHT: 63 IN

## 2019-08-30 DIAGNOSIS — M89.8X1 PAIN OF RIGHT CLAVICLE: ICD-10-CM

## 2019-08-30 DIAGNOSIS — S49.91XA INJURY OF RIGHT SHOULDER, INITIAL ENCOUNTER: Primary | ICD-10-CM
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
## 2019-08-30 PROCEDURE — 25000003 PHARM REV CODE 250: Performed by: NURSE PRACTITIONER

## 2019-08-30 PROCEDURE — 73030 XR SHOULDER TRAUMA 3 VIEW RIGHT: ICD-10-PCS | Mod: 26,RT,, | Performed by: RADIOLOGY

## 2019-08-30 PROCEDURE — 99283 EMERGENCY DEPT VISIT LOW MDM: CPT | Mod: 25

## 2019-08-30 PROCEDURE — 73000 XR CLAVICLE RIGHT: ICD-10-PCS | Mod: 26,RT,, | Performed by: RADIOLOGY

## 2019-08-30 PROCEDURE — 73030 X-RAY EXAM OF SHOULDER: CPT | Mod: 26,RT,, | Performed by: RADIOLOGY

## 2019-08-30 PROCEDURE — 73000 X-RAY EXAM OF COLLAR BONE: CPT | Mod: 26,RT,, | Performed by: RADIOLOGY

## 2019-08-30 PROCEDURE — 73030 X-RAY EXAM OF SHOULDER: CPT | Mod: TC,FY,RT

## 2019-08-30 PROCEDURE — 73000 X-RAY EXAM OF COLLAR BONE: CPT | Mod: TC,FY,RT

## 2019-08-30 RX ORDER — NAPROXEN 500 MG/1
500 TABLET ORAL 2 TIMES DAILY
COMMUNITY

## 2019-08-30 RX ORDER — METHOCARBAMOL 500 MG/1
500 TABLET, FILM COATED ORAL 4 TIMES DAILY
COMMUNITY

## 2019-08-30 RX ORDER — IBUPROFEN 400 MG/1
800 TABLET ORAL
Status: COMPLETED | OUTPATIENT
Start: 2019-08-30 | End: 2019-08-30

## 2019-08-30 RX ADMIN — IBUPROFEN 800 MG: 400 TABLET ORAL at 01:08

## 2019-08-30 NOTE — ED PROVIDER NOTES
Encounter Date: 2019       History   No chief complaint on file.    Chio Garvey is a 53 y.o female with PMHx including anxiety and migraine headache. 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 She reports that she went to sit down on stool. It rolled out from under her into the wall causing her to fall onto right side.    She denies head injury or LOC. She denies neck or back pain    She complaints of pain to right shoulder and clavicle. She has limited ROM of shoulder    Right distal extremity is neurovascularly intact    She has previous clavicular dislocated due to a work related injury on 2018            Review of patient's allergies indicates:  No Known Allergies  Past Medical History:   Diagnosis Date    Anxiety     Migraine headache      Past Surgical History:   Procedure Laterality Date     SECTION       Family History   Problem Relation Age of Onset    Hypertension Mother     Pancreatitis Father     Heart attack Father     Panic disorder Sister     Anxiety disorder Sister     No Known Problems Brother     Thyroid disease Sister     Hypertension Sister     No Known Problems Sister     Arthritis Sister     Bipolar disorder Sister      Social History     Tobacco Use    Smoking status: Current Some Day Smoker     Packs/day: 0.50    Smokeless tobacco: Never Used   Substance Use Topics    Alcohol use: Yes     Comment: social    Drug use: No     Review of Systems   Constitutional: Negative.  Negative for fever.   HENT: Negative.  Negative for sore throat.    Eyes: Negative.    Respiratory: Negative.  Negative for shortness of breath.    Cardiovascular: Negative.  Negative for chest pain.   Gastrointestinal: Negative.  Negative for nausea.   Endocrine: Negative.    Genitourinary: Negative.  Negative for dysuria.   Musculoskeletal: Positive for arthralgias (right shoulder, right clavicle).  Negative for back pain.   Skin: Negative for rash.   Allergic/Immunologic: Negative.    Neurological: Negative.  Negative for weakness.   Hematological: Negative.  Does not bruise/bleed easily.   Psychiatric/Behavioral: Negative.    All other systems reviewed and are negative.      Physical Exam     Initial Vitals   BP Pulse Resp Temp SpO2   -- -- -- -- --      MAP       --         Physical Exam    Nursing note and vitals reviewed.  Constitutional: She appears well-developed and well-nourished.   HENT:   Head: Normocephalic.   Eyes: Conjunctivae are normal.   Neck: Normal range of motion.   Cardiovascular: Normal rate.   Pulmonary/Chest: Breath sounds normal.   Musculoskeletal: She exhibits tenderness.        Right shoulder: She exhibits decreased range of motion, tenderness, bony tenderness and pain. She exhibits no swelling, no effusion, no crepitus, no deformity, no laceration, no spasm, normal pulse and normal strength.        Arms:  Neurological: She is alert and oriented to person, place, and time.         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 She reports that she went to sit down on stool. It rolled out from under her into the wall causing her to fall onto right side.    She denies head injury or LOC. She denies neck or back pain    She complaints of pain to right shoulder and clavicle. She has limited ROM of shoulder    Right distal extremity is neurovascularly intact    She has previous clavicular dislocated due to a work related injury on 2018        Differential Diagnosis:   Shoulder bone injury, ligament/tendon injury, strain     Clavicular fracture  ED Management:  Motrin and ice for pain    XR right shoulder and clavicle with no acute finding    Discussed physical exam  findings with patient  No acute emergent medical condition identified at this time to warrant further testing/diagnostics  At this time, I believe the patient is clinically stable for discharge.   Patient to follow up with PCP in 1-2 days.  The patient acknowledges that close follow up with a MD is required after all ER visits  Pt given instructions; take all medications prescribed in the ER as directed.   Patient agrees to comply with all instruction and direction given in the ER  Pt agrees to return to ER if any symptoms reoccur                               Clinical Impression:       ICD-10-CM ICD-9-CM   1. Injury of right shoulder, initial encounter S49.91XA 959.2   2. Fall W19.XXXA E888.9   3. Pain of right clavicle M89.8X1 733.90                                Symone Saravia NP  08/30/19 1526

## 2019-10-09 ENCOUNTER — HOSPITAL ENCOUNTER (EMERGENCY)
Facility: HOSPITAL | Age: 54
Discharge: HOME | End: 2019-10-09
Attending: INTERNAL MEDICINE
Payer: COMMERCIAL

## 2019-10-09 VITALS
DIASTOLIC BLOOD PRESSURE: 107 MMHG | HEART RATE: 90 BPM | RESPIRATION RATE: 16 BRPM | HEIGHT: 63 IN | SYSTOLIC BLOOD PRESSURE: 153 MMHG | WEIGHT: 170 LBS | BODY MASS INDEX: 30.12 KG/M2 | TEMPERATURE: 98 F | OXYGEN SATURATION: 100 %

## 2019-10-09 DIAGNOSIS — R51.9 NONINTRACTABLE HEADACHE, UNSPECIFIED CHRONICITY PATTERN, UNSPECIFIED HEADACHE TYPE: Primary | ICD-10-CM

## 2019-10-09 PROCEDURE — 99283 EMERGENCY DEPT VISIT LOW MDM: CPT

## 2019-10-09 RX ORDER — ONDANSETRON 4 MG/1
4 TABLET, ORALLY DISINTEGRATING ORAL EVERY 8 HOURS PRN
Qty: 10 TABLET | Refills: 0 | Status: SHIPPED | OUTPATIENT
Start: 2019-10-09 | End: 2019-11-11

## 2019-10-09 NOTE — ED PROVIDER NOTES
"Encounter Date: 10/9/2019       History     Chief Complaint   Patient presents with    Headache     Chio Garvey is a 54 y.o female with no sign PMHx. She presents to ED with complaint of "Migraine"     Patient reports vice-like, frontal headache. Pain does not radiate.    Pain started gradually on Monday. No thunder-clap headache    Patient reports hx of migraine headaches. Her last migraine was a few years prior.     This is not her worst headache. She reports that this headache is similar to headaches she has experienced in the past    Patient rates in headache @ 3/10. She reports headache is better today. She needs a work note     Normal neurological status        Review of patient's allergies indicates:  No Known Allergies  Past Medical History:   Diagnosis Date    Anxiety     Migraine headache      Past Surgical History:   Procedure Laterality Date     SECTION       Family History   Problem Relation Age of Onset    Hypertension Mother     Pancreatitis Father     Heart attack Father     Panic disorder Sister     Anxiety disorder Sister     No Known Problems Brother     Thyroid disease Sister     Hypertension Sister     No Known Problems Sister     Arthritis Sister     Bipolar disorder Sister      Social History     Tobacco Use    Smoking status: Current Some Day Smoker     Packs/day: 0.50    Smokeless tobacco: Never Used   Substance Use Topics    Alcohol use: Yes     Comment: social    Drug use: No     Review of Systems   Constitutional: Negative.  Negative for fever.   HENT: Negative.  Negative for sore throat.    Eyes: Negative.    Respiratory: Negative.  Negative for shortness of breath.    Cardiovascular: Negative.  Negative for chest pain.   Gastrointestinal: Positive for nausea.   Endocrine: Negative.    Genitourinary: Negative.  Negative for difficulty urinating and dysuria.   Musculoskeletal: Negative.  Negative for back pain.   Skin: Negative for rash.   Allergic/Immunologic: " "Negative.    Neurological: Positive for headaches. Negative for weakness.   Hematological: Negative.  Does not bruise/bleed easily.   Psychiatric/Behavioral: Negative.    All other systems reviewed and are negative.      Physical Exam     Initial Vitals [10/09/19 1331]   BP Pulse Resp Temp SpO2   (!) 153/107 90 16 98.1 °F (36.7 °C) 100 %      MAP       --         Physical Exam    Nursing note and vitals reviewed.  Constitutional: She appears well-developed and well-nourished.   HENT:   Head: Normocephalic.   Eyes: Conjunctivae are normal.   Neck: Normal range of motion.   Cardiovascular: Normal rate.   Pulmonary/Chest: Breath sounds normal.   Musculoskeletal: Normal range of motion.   Neurological: She is alert and oriented to person, place, and time. She has normal strength. She displays a negative Romberg sign. GCS score is 15. GCS eye subscore is 4. GCS verbal subscore is 5. GCS motor subscore is 6.   Skin: Skin is warm.   Psychiatric: She has a normal mood and affect. Her behavior is normal. Judgment and thought content normal.         ED Course   Procedures  Labs Reviewed - No data to display       Imaging Results    None          Medical Decision Making:   Initial Assessment:   Patient with complaint of "Migraine"     Patient reports vice-like, frontal headache. Pain does not radiate.    Pain started gradually on Monday. No thunder-clap headache    Patient reports hx of migraine headaches. Her last migraine was a few years prior.     This is not her worst headache. She reports that this headache is similar to headaches she has experienced in the past    Patient rates in headache @ 3/10. She reports headache is better today. She needs a work note     Normal neurological status    Differential Diagnosis:   Migraine, tension headache, intracranial bleed, mass  ED Management:  Patient does not want to be medicated now because she drive to hospital    Discussed physical exam findings with patient  No acute emergent " medical condition identified at this time to warrant further testing/diagnostics  At this time, I believe the patient is clinically stable for discharge.   Patient to follow up with PCP in 1-2 days.  The patient acknowledges that close follow up with a MD is required after all ER visits  Pt given instructions; take all medications prescribed in the ER as directed.   Patient agrees to comply with all instruction and direction given in the ER  Pt agrees to return to ER if any symptoms reoccur                               Clinical Impression:       ICD-10-CM ICD-9-CM   1. Nonintractable headache, unspecified chronicity pattern, unspecified headache type R51 784.0                                Symone Saravia NP  10/09/19 1801

## 2019-11-11 ENCOUNTER — HOSPITAL ENCOUNTER (EMERGENCY)
Facility: HOSPITAL | Age: 54
Discharge: HOME | End: 2019-11-11
Attending: EMERGENCY MEDICINE
Payer: OTHER MISCELLANEOUS

## 2019-11-11 VITALS
SYSTOLIC BLOOD PRESSURE: 138 MMHG | WEIGHT: 175 LBS | BODY MASS INDEX: 31.01 KG/M2 | TEMPERATURE: 99 F | HEIGHT: 63 IN | HEART RATE: 75 BPM | DIASTOLIC BLOOD PRESSURE: 102 MMHG | RESPIRATION RATE: 16 BRPM | OXYGEN SATURATION: 99 %

## 2019-11-11 DIAGNOSIS — M25.432 PAIN AND SWELLING OF LEFT WRIST: ICD-10-CM

## 2019-11-11 DIAGNOSIS — M25.532 PAIN AND SWELLING OF LEFT WRIST: ICD-10-CM

## 2019-11-11 DIAGNOSIS — S63.502A SPRAIN OF LEFT WRIST, INITIAL ENCOUNTER: Primary | ICD-10-CM

## 2019-11-11 PROCEDURE — 99283 EMERGENCY DEPT VISIT LOW MDM: CPT | Mod: 25

## 2019-11-11 PROCEDURE — 73110 X-RAY EXAM OF WRIST: CPT | Mod: 26,LT,, | Performed by: RADIOLOGY

## 2019-11-11 PROCEDURE — 73110 XR WRIST COMPLETE 3 VIEWS LEFT: ICD-10-PCS | Mod: 26,LT,, | Performed by: RADIOLOGY

## 2019-11-11 PROCEDURE — 73110 X-RAY EXAM OF WRIST: CPT | Mod: TC,FY,LT

## 2019-11-11 RX ORDER — IBUPROFEN 800 MG/1
TABLET ORAL
Qty: 20 TABLET | Refills: 0 | Status: SHIPPED | OUTPATIENT
Start: 2019-11-11

## 2019-11-11 NOTE — ED PROVIDER NOTES
Encounter Date: 2019       History     Chief Complaint   Patient presents with    left wrist pain     Patient to ER for pain to left wrist. She reports while working she was lifting a heavy garbage bag and felt a pop to left wrist-with some pain. Reports happened today just prior to arrival.         Review of patient's allergies indicates:  No Known Allergies  Past Medical History:   Diagnosis Date    Anxiety     Migraine headache      Past Surgical History:   Procedure Laterality Date     SECTION       Family History   Problem Relation Age of Onset    Hypertension Mother     Pancreatitis Father     Heart attack Father     Panic disorder Sister     Anxiety disorder Sister     No Known Problems Brother     Thyroid disease Sister     Hypertension Sister     No Known Problems Sister     Arthritis Sister     Bipolar disorder Sister      Social History     Tobacco Use    Smoking status: Current Some Day Smoker     Packs/day: 0.50    Smokeless tobacco: Never Used   Substance Use Topics    Alcohol use: Yes     Comment: social    Drug use: No     Review of Systems   Musculoskeletal: Positive for arthralgias (left wrist).   All other systems reviewed and are negative.      Physical Exam     Initial Vitals [19 1151]   BP Pulse Resp Temp SpO2   (!) 138/102 75 16 98.6 °F (37 °C) 99 %      MAP       --         Physical Exam    Nursing note and vitals reviewed.  Constitutional: She appears well-developed and well-nourished.   Cardiovascular: Normal rate, regular rhythm and normal heart sounds.   Pulmonary/Chest: Breath sounds normal.   Musculoskeletal: She exhibits tenderness (left wrist anteriorly, limited ROM due to pain. No edema or bruising. ).   Neurological: She is alert and oriented to person, place, and time. She has normal strength.   Skin: Skin is warm.         ED Course   Procedures  Labs Reviewed - No data to display       Imaging Results    None        Xray Left Wrist: No acute  findings.             Ace wrap.                       Clinical Impression:       ICD-10-CM ICD-9-CM   1. Sprain of left wrist, initial encounter S63.502A 842.00   2. Pain and swelling of left wrist M25.532 719.43    M25.432 719.03                             MELANIE Monte  11/11/19 1302